# Patient Record
Sex: FEMALE | Race: WHITE | Employment: PART TIME | URBAN - METROPOLITAN AREA
[De-identification: names, ages, dates, MRNs, and addresses within clinical notes are randomized per-mention and may not be internally consistent; named-entity substitution may affect disease eponyms.]

---

## 2018-09-18 ENCOUNTER — TELEPHONE (OUTPATIENT)
Dept: FAMILY MEDICINE | Facility: CLINIC | Age: 23
End: 2018-09-18

## 2018-09-18 ENCOUNTER — OFFICE VISIT (OUTPATIENT)
Dept: FAMILY MEDICINE | Facility: CLINIC | Age: 23
End: 2018-09-18
Payer: COMMERCIAL

## 2018-09-18 VITALS
TEMPERATURE: 98.2 F | HEIGHT: 68 IN | DIASTOLIC BLOOD PRESSURE: 72 MMHG | OXYGEN SATURATION: 99 % | SYSTOLIC BLOOD PRESSURE: 108 MMHG | HEART RATE: 50 BPM | BODY MASS INDEX: 20.95 KG/M2 | WEIGHT: 138.2 LBS

## 2018-09-18 DIAGNOSIS — F60.3 BORDERLINE PERSONALITY DISORDER (H): ICD-10-CM

## 2018-09-18 DIAGNOSIS — G25.81 RESTLESS LEGS SYNDROME (RLS): ICD-10-CM

## 2018-09-18 DIAGNOSIS — F33.41 RECURRENT MAJOR DEPRESSIVE DISORDER, IN PARTIAL REMISSION (H): ICD-10-CM

## 2018-09-18 DIAGNOSIS — G43.811 OTHER MIGRAINE WITH STATUS MIGRAINOSUS, INTRACTABLE: ICD-10-CM

## 2018-09-18 DIAGNOSIS — G89.29 CHRONIC BILATERAL LOW BACK PAIN WITH RIGHT-SIDED SCIATICA: ICD-10-CM

## 2018-09-18 DIAGNOSIS — H53.9 VISUAL DISTURBANCE: ICD-10-CM

## 2018-09-18 DIAGNOSIS — E55.9 VITAMIN D DEFICIENCY: ICD-10-CM

## 2018-09-18 DIAGNOSIS — D50.9 IRON DEFICIENCY ANEMIA, UNSPECIFIED IRON DEFICIENCY ANEMIA TYPE: ICD-10-CM

## 2018-09-18 DIAGNOSIS — M54.41 CHRONIC BILATERAL LOW BACK PAIN WITH RIGHT-SIDED SCIATICA: ICD-10-CM

## 2018-09-18 LAB
ALBUMIN UR-MCNC: NEGATIVE MG/DL
APPEARANCE UR: CLEAR
BILIRUB UR QL STRIP: NEGATIVE
COLOR UR AUTO: YELLOW
ERYTHROCYTE [DISTWIDTH] IN BLOOD BY AUTOMATED COUNT: 13.9 % (ref 10–15)
FERRITIN SERPL-MCNC: 32 NG/ML (ref 12–150)
GLUCOSE UR STRIP-MCNC: NEGATIVE MG/DL
HCT VFR BLD AUTO: 45.7 % (ref 35–47)
HGB BLD-MCNC: 16.2 G/DL (ref 11.7–15.7)
HGB UR QL STRIP: NEGATIVE
KETONES UR STRIP-MCNC: NEGATIVE MG/DL
LEUKOCYTE ESTERASE UR QL STRIP: NEGATIVE
MCH RBC QN AUTO: 33 PG (ref 26.5–33)
MCHC RBC AUTO-ENTMCNC: 35.4 G/DL (ref 31.5–36.5)
MCV RBC AUTO: 93 FL (ref 78–100)
NITRATE UR QL: NEGATIVE
PH UR STRIP: 6 PH (ref 5–7)
PLATELET # BLD AUTO: 202 10E9/L (ref 150–450)
RBC # BLD AUTO: 4.91 10E12/L (ref 3.8–5.2)
SOURCE: NORMAL
SP GR UR STRIP: <=1.005 (ref 1–1.03)
UROBILINOGEN UR STRIP-ACNC: 0.2 EU/DL (ref 0.2–1)
WBC # BLD AUTO: 6.4 10E9/L (ref 4–11)

## 2018-09-18 PROCEDURE — 80053 COMPREHEN METABOLIC PANEL: CPT | Performed by: PHYSICIAN ASSISTANT

## 2018-09-18 PROCEDURE — 81003 URINALYSIS AUTO W/O SCOPE: CPT | Performed by: PHYSICIAN ASSISTANT

## 2018-09-18 PROCEDURE — 84443 ASSAY THYROID STIM HORMONE: CPT | Performed by: PHYSICIAN ASSISTANT

## 2018-09-18 PROCEDURE — 82306 VITAMIN D 25 HYDROXY: CPT | Performed by: PHYSICIAN ASSISTANT

## 2018-09-18 PROCEDURE — 82728 ASSAY OF FERRITIN: CPT | Performed by: PHYSICIAN ASSISTANT

## 2018-09-18 PROCEDURE — 36415 COLL VENOUS BLD VENIPUNCTURE: CPT | Performed by: PHYSICIAN ASSISTANT

## 2018-09-18 PROCEDURE — 85027 COMPLETE CBC AUTOMATED: CPT | Performed by: PHYSICIAN ASSISTANT

## 2018-09-18 PROCEDURE — 99205 OFFICE O/P NEW HI 60 MIN: CPT | Performed by: PHYSICIAN ASSISTANT

## 2018-09-18 NOTE — PATIENT INSTRUCTIONS
Increase dose of magnesium to 2-3 tsp daily  Follow up with physical therapy, sexual health, and neurology  Get labs done today  Have your pharmacy fax us a request for refills  Return to clinic for any new or worsening symptoms or go to ER Urgent care in off hours

## 2018-09-18 NOTE — TELEPHONE ENCOUNTER
Reason for call:  Other   Patient called regarding (reason for call): call back  Additional comments: The patient saw Leticia Pettymohinder today and was told that they needed to get their medical records sent over before medication could be prescribed. The medical records were faxed on the patients end and they would like a call back once they are received at the clinic so they know we have gotten them.    Phone number to reach patient:  Cell number on file:    Telephone Information:   Mobile [phone]       Best Time: Any    Can we leave a detailed message on this number?  YES

## 2018-09-18 NOTE — PROGRESS NOTES
"  SUBJECTIVE:   Sally Sanchez is a 22 year old female who presents to clinic today for the following health issues:    Medication Followup of Lamotrigine     Taking Medication as prescribed: yes    Side Effects:  None, but keeps focus and stay awake but feel that it is not working much.     Medication Helping Symptoms:  Yes, for borderline personality and depression      Questions/Concerns: referral to a therapist.    Used to live in [place] for 2 years.   Originally from [place],   Works full time at [employer]  Wants to get into nutrition,     Has been on lamotrigine for 2 years. Works well. Has been on his current dose for 1.5 years. Depression stable  Also gabapentin for RLS, doesn't seem to work very well  On testosterone  (transgender FtM)    Last PAP was 3 years ago. Normal.   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 Declines medical spine specialist    Also with right sided headaches. Behind his eye  Gets twitching of cheek and chin, only on right side.   Also gets muscle pain that comes and goes, can be intense. Gets sore very easily.     Gets about 7 hours of sleep per night, fluctuating quality. RLS keeps him up          Problem list and histories reviewed & adjusted, as indicated.  Additional history: as documented    ROS:  CONSTITUTIONAL: NEGATIVE for fever, chills, change in weight  INTEGUMENTARY/SKIN: NEGATIVE for worrisome rashes, moles or lesions  ENT/MOUTH: NEGATIVE for ear, mouth and throat problems  RESP: NEGATIVE for significant cough or SOB  CV: NEGATIVE for chest pain, palpitations or peripheral edema  GI: NEGATIVE for nausea, abdominal pain, heartburn, or change " "in bowel habits  : NEGATIVE for frequency, dysuria, or hematuria  NEURO: NEGATIVE for behavior changes, dysphagia, involuntary movements, loss of consciousness, memory problems and vertigo  ENDOCRINE: NEGATIVE for temperature intolerance, skin/hair changes  HEME: NEGATIVE for bleeding problems  PSYCHIATRIC: NEGATIVE for changes in mood or affect    Patient Active Problem List   Diagnosis     Restless legs syndrome (RLS)     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 No pertinent surgical history.    Social History   Substance Use Topics     Smoking status: Never Smoker     Smokeless tobacco: Never Used     Alcohol use Yes      Comment: not often      History reviewed. No pertinent family history.            OBJECTIVE:                                                    /72  Pulse 50  Temp 98.2  F (36.8  C) (Oral)  Ht 5' 7.91\" (1.725 m)  Wt 138 lb 3.2 oz (62.7 kg)  SpO2 99%  BMI 21.07 kg/m2 Body mass index is 21.07 kg/(m^2).   GENERAL: healthy, alert, well nourished, well hydrated, no distress  EYES: Eyes grossly normal to inspection, extraocular movements - intact, and PERRL  HENT: ear canals- normal; TMs- normal; Nose- normal; Mouth- no ulcers, no lesions  NECK: no tenderness, no adenopathy, no asymmetry, no masses, no stiffness; thyroid- normal to palpation  RESP: lungs clear to auscultation - no rales, no rhonchi, no wheezes  CV: regular rates and rhythm, normal S1 S2, no S3 or S4 and no murmur, no click or rub -  MS: extremities- no gross deformities noted, no edema  SKIN: no suspicious lesions, no rashes  NEURO: strength and tone- normal, sensory exam- grossly normal, mentation- intact, speech- normal, reflexes- symmetric  PSYCH: Alert and oriented times 3; speech- coherent , normal rate and volume; able to articulate logical thoughts, able to abstract reason, no " tangential thoughts, no hallucinations or delusions, affect- normal    No results found for this or any previous visit (from the past 24 hour(s)).       ASSESSMENT/PLAN:                                                        ICD-10-CM    1. 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 Restless legs syndrome (RLS) G25.81 NEUROLOGY ADULT REFERRAL     CBC with platelets     Ferritin     Vitamin D Deficiency     Comprehensive metabolic panel     *UA reflex to Microscopic     TSH with free T4 reflex   3. Recurrent major depressive disorder, in partial remission (H) F33.41    4. Borderline personality disorder F60.3    5. Chronic bilateral low back pain with right-sided sciatica M54.41 [facility] PT, HAND, AND CHIROPRACTIC REFERRAL    G89.29    6. Other migraine with status migrainosus, intractable G43.811 NEUROLOGY ADULT REFERRAL   7. Visual disturbance H53.9 OPHTHALMOLOGY ADULT REFERRAL     Several compalints today. Follow-up with neurology to further discuss RLS, migraine issues and facial twitching. I'll get some baseline labs today. Follow up with physical therapy for chronic low back pain to work on core strengthening. Follow up with sexual health for hormone therapy. I will continue to prescribe lamotrigine as he has been stable on this dose for a quite a while. Same with gabapentin (although may need to try something else at recommendation of neurology since this doesn't seem to be working). Check iron levels for this as well. Follow up with eye doc. Return to clinic for any new or worsening symptoms or go to ER Urgent care in off hours    Patient Instructions   Increase dose of magnesium to 2-3 tsp daily  Follow up with physical therapy, sexual health, and neurology  Get labs done today  Have your pharmacy fax us a request for refills  Return to clinic for any new or worsening symptoms or go to ER Urgent care in off hours            Estimated body mass index is  "21.07 kg/(m^2) as calculated from the following:    Height as of this encounter: 5' 7.91\" (1.725 m).    Weight as of this encounter: 138 lb 3.2 oz (62.7 kg).       Pretty Estrada  [facility]    "

## 2018-09-18 NOTE — LETTER
September 21, 2018      Sally Sanchez  [address] [facility]   [place] [address]        Dear ,    Your iron and Vitamin D levels are low. Medication sent to pharmacy to start taking. Iron may help with RLS. Otherwise labs are normal       Resulted Orders   CBC with platelets   Result Value Ref Range    WBC 6.4 4.0 - 11.0 10e9/L    RBC Count 4.91 3.8 - 5.2 10e12/L    Hemoglobin 16.2 (H) 11.7 - 15.7 g/dL    Hematocrit 45.7 35.0 - 47.0 %    MCV 93 78 - 100 fl    MCH 33.0 26.5 - 33.0 pg    MCHC 35.4 31.5 - 36.5 g/dL    RDW 13.9 10.0 - 15.0 %    Platelet Count 202 150 - 450 10e9/L   Ferritin   Result Value Ref Range    Ferritin 32 12 - 150 ng/mL   Vitamin D Deficiency   Result Value Ref Range    Vitamin D Deficiency screening 15 (L) 20 - 75 ug/L      Comment:      Season, race, dietary intake, and treatment affect the concentration of   25-hydroxy-Vitamin D. Values may decrease during winter months and increase   during summer months. Values 20-29 ug/L may indicate Vitamin D insufficiency   and values <20 ug/L may indicate Vitamin D deficiency.  Vitamin D determination is routinely performed by an immunoassay specific for   25 hydroxyvitamin D3.  If an individual is on vitamin D2 (ergocalciferol)   supplementation, please specify 25 OH vitamin D2 and D3 level determination by   LCMSMS test VITD23.     Comprehensive metabolic panel   Result Value Ref Range    Sodium 140 133 - 144 mmol/L    Potassium 4.4 3.4 - 5.3 mmol/L    Chloride 108 94 - 109 mmol/L    Carbon Dioxide 23 20 - 32 mmol/L    Anion Gap 9 3 - 14 mmol/L    Glucose 79 70 - 99 mg/dL    Urea Nitrogen 10 7 - 30 mg/dL    Creatinine 0.73 0.52 - 1.04 mg/dL    GFR Estimate >90 >60 mL/min/1.7m2      Comment:      Non  GFR Calc    GFR Estimate If Black >90 >60 mL/min/1.7m2      Comment:       GFR Calc    Calcium 8.5 8.5 - 10.1 mg/dL    Bilirubin Total 1.4 (H) 0.2 - 1.3 mg/dL    Albumin 4.1 3.4 - 5.0 g/dL    Protein Total  6.8 6.8 - 8.8 g/dL    Alkaline Phosphatase 52 40 - 150 U/L    ALT 22 0 - 50 U/L    AST 25 0 - 45 U/L   *UA reflex to Microscopic   Result Value Ref Range    Color Urine Yellow     Appearance Urine Clear     Glucose Urine Negative NEG^Negative mg/dL    Bilirubin Urine Negative NEG^Negative    Ketones Urine Negative NEG^Negative mg/dL    Specific Gravity Urine <=1.005 1.003 - 1.035    Blood Urine Negative NEG^Negative    pH Urine 6.0 5.0 - 7.0 pH    Protein Albumin Urine Negative NEG^Negative mg/dL    Urobilinogen Urine 0.2 0.2 - 1.0 EU/dL    Nitrite Urine Negative NEG^Negative    Leukocyte Esterase Urine Negative NEG^Negative    Source Midstream Urine    TSH with free T4 reflex   Result Value Ref Range    TSH 0.95 0.40 - 4.00 mU/L       If you have any questions or concerns, please call the clinic at the number listed above.       Sincerely,        Pretty Estrada PA-C

## 2018-09-18 NOTE — MR AVS SNAPSHOT
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    -  1    Restless legs syndrome (RLS)        Recurrent major depressive disorder, in partial remission (H)        Borderline personality disorder        Chronic bilateral low back pain with right-sided sciatica        Other migraine with status migrainosus, intractable        Visual disturbance          Care Instructions    Increase dose of magnesium to 2-3 tsp daily  Follow up with physical therapy, sexual health, and neurology  Get labs done today  Have your pharmacy fax us a request for refills  Return to clinic for any new or worsening symptoms or go to ER Urgent care in off hours              Follow-ups after your visit        Additional Services     [facility]       Reason for referral? On hormone therapy for transgender female to males  Is the patient transgengered? Yes   What is the patient's preferred name? Rodo  Is the patient MtF or FtM? Ft    Appointment Line: [phone]            [facility] PT, HAND, AND CHIROPRACTIC REFERRAL       **This order will print in the [facility] Scheduling Office**    Physical Therapy, Hand Therapy and Chiropractic Care are available through:    *[facility]  *[facility] [place]  *[facility]    Call one number to schedule at any of the above locations: [phone].    Your provider has referred you to: Physical Therapy at [facility] or [facility]    Indication/Reason for Referral: low back pain with right sided sciatica  Onset of Illness: \"since I can remember\"  Therapy Orders: Evaluate and Treat  Special Programs: None  Special Request: None    Patricia Gomez "      Additional Comments for the Therapist or Chiropractor:     Please be aware that coverage of these services is subject to the terms and limitations of your health insurance plan.  Call member services at your health plan with any benefit or coverage questions.      Please bring the following to your appointment:    *Your personal calendar for scheduling future appointments  *Comfortable clothing            NEUROLOGY ADULT REFERRAL       Your provider has referred you for the following:   Consult at [facility]: [facility] [phone]   [email or web address]  [facility]: [facility] [phone]   [email or web address]    Please be aware that coverage of these services is subject to the terms and limitations of your health insurance plan.  Call member services at your health plan with any benefit or coverage questions.      Please bring the following with you to your appointment:    (1) Any X-Rays, CTs or MRIs which have been performed.  Contact the facility where they were done to arrange for  prior to your scheduled appointment.    (2) List of current medications  (3) This referral request   (4) Any documents/labs given to you for this referral            OPHTHALMOLOGY ADULT REFERRAL       Your provider has referred you to: [facility]: [facility] [place] [phone]   [email or web address]    Please be aware that coverage of these services is subject to the terms and limitations of your health insurance plan.  Call member services at your health plan with any benefit or coverage questions.      Please bring the following with you to your appointment:    (1) Any X-Rays, CTs or MRIs which have been performed.  Contact the facility where they were done to arrange for  prior to your scheduled appointment.    (2) List of current medications  (3) This referral request   (4) Any  "documents/labs given to you for this referral                  Who to contact     If you have questions or need follow up information about today's clinic visit or your schedule please contact [facility] directly at [phone].  Normal or non-critical lab and imaging results will be communicated to you by MyChart, letter or phone within 4 business days after the clinic has received the results. If you do not hear from us within 7 days, please contact the clinic through MyChart or phone. If you have a critical or abnormal lab result, we will notify you by phone as soon as possible.  Submit refill requests through [employer] or call your pharmacy and they will forward the refill request to us. Please allow 3 business days for your refill to be completed.          Additional Information About Your Visit        [employer] Information     [employer] lets you send messages to your doctor, view your test results, renew your prescriptions, schedule appointments and more. To sign up, go to [email or web address] . Click on \"Log in\" on the left side of the screen, which will take you to the Welcome page. Then click on \"Sign up Now\" on the right side of the page.     You will be asked to enter the access code listed below, as well as some personal information. Please follow the directions to create your username and password.     Your access code is: [email or web address]  Expires: 2018 11:04 AM     Your access code will  in 90 days. If you need help or a new code, please call your [facility] or [phone].        Care EveryWhere ID     This is your Care EveryWhere ID. This could be used by other organizations to access your [place] medical records  [id number]        Your Vitals Were     Pulse Temperature Height Pulse Oximetry BMI (Body Mass Index)       50 98.2  F (36.8  C) (Oral) 5' 7.91\" (1.725 m) 99% 21.07 kg/m2        Blood Pressure from Last 3 Encounters:   18 108/72    Weight from Last 3 " Encounters:   09/18/18 138 lb 3.2 oz (62.7 kg)              We Performed the Following     *UA reflex to Microscopic     CBC with platelets     [facility]     Comprehensive metabolic panel     Ferritin     [facility] PT, HAND, AND CHIROPRACTIC REFERRAL     NEUROLOGY ADULT REFERRAL     OPHTHALMOLOGY ADULT REFERRAL     TSH with free T4 reflex     Vitamin D Deficiency        Primary Care Provider Office Phone # Fax #    [facility] [phone]       [address] [address]        Equal Access to Services     PHOENIX CERDA : Hadii aad ku hadasho Soomaali, waaxda luqadaha, qaybta kaalmada adeegyada, waxay idiin hayaan adeeg kharash la'mag jaeger. So [facility] [phone].    ATENCIÓN: Si habla español, tiene a russell disposición servicios gratuitos de asistencia lingüística. Ll[facility] [place] [phone].    We comply with applicable federal civil rights laws and [place] laws. We do not discriminate on the basis of race, color, national origin, age, disability, sex, sexual orientation, or gender identity.            Thank you!     Thank you for choosing [facility]  for your care. Our goal is always to provide you with excellent care. Hearing back from our patients is one way we can continue to improve our services. Please take a few minutes to complete the written survey that you may receive in the mail after your visit with us. Thank you!             Your Updated Medication List - Protect others around you: Learn how to safely use, store and throw away your medicines at www.disposemymeds.org.      Notice  As of 9/18/2018 11:04 AM    You have not been prescribed any medications.

## 2018-09-19 ENCOUNTER — TELEPHONE (OUTPATIENT)
Dept: FAMILY MEDICINE | Facility: CLINIC | Age: 23
End: 2018-09-19

## 2018-09-19 DIAGNOSIS — F33.41 RECURRENT MAJOR DEPRESSIVE DISORDER, IN PARTIAL REMISSION (H): ICD-10-CM

## 2018-09-19 DIAGNOSIS — G25.81 RESTLESS LEGS SYNDROME (RLS): Primary | ICD-10-CM

## 2018-09-19 LAB
ALBUMIN SERPL-MCNC: 4.1 G/DL (ref 3.4–5)
ALP SERPL-CCNC: 52 U/L (ref 40–150)
ALT SERPL W P-5'-P-CCNC: 22 U/L (ref 0–50)
ANION GAP SERPL CALCULATED.3IONS-SCNC: 9 MMOL/L (ref 3–14)
AST SERPL W P-5'-P-CCNC: 25 U/L (ref 0–45)
BILIRUB SERPL-MCNC: 1.4 MG/DL (ref 0.2–1.3)
BUN SERPL-MCNC: 10 MG/DL (ref 7–30)
CALCIUM SERPL-MCNC: 8.5 MG/DL (ref 8.5–10.1)
CHLORIDE SERPL-SCNC: 108 MMOL/L (ref 94–109)
CO2 SERPL-SCNC: 23 MMOL/L (ref 20–32)
CREAT SERPL-MCNC: 0.73 MG/DL (ref 0.52–1.04)
DEPRECATED CALCIDIOL+CALCIFEROL SERPL-MC: 15 UG/L (ref 20–75)
GFR SERPL CREATININE-BSD FRML MDRD: >90 ML/MIN/1.7M2
GLUCOSE SERPL-MCNC: 79 MG/DL (ref 70–99)
POTASSIUM SERPL-SCNC: 4.4 MMOL/L (ref 3.4–5.3)
PROT SERPL-MCNC: 6.8 G/DL (ref 6.8–8.8)
SODIUM SERPL-SCNC: 140 MMOL/L (ref 133–144)
TSH SERPL DL<=0.005 MIU/L-ACNC: 0.95 MU/L (ref 0.4–4)

## 2018-09-19 RX ORDER — LAMOTRIGINE 100 MG/1
TABLET ORAL
Qty: 60 TABLET | Refills: 1 | Status: SHIPPED | OUTPATIENT
Start: 2018-09-19 | End: 2019-02-18

## 2018-09-19 RX ORDER — GABAPENTIN 600 MG/1
TABLET ORAL
Qty: 60 TABLET | Refills: 1 | Status: SHIPPED | OUTPATIENT
Start: 2018-09-19 | End: 2019-06-13

## 2018-09-19 NOTE — TELEPHONE ENCOUNTER
Spoke with pt, she will request the refills from the pharmacy be sent to us as requested by Leticia  Also let pt know that we have not received any records from the previous provider     lamotrigine and Gabapentin     was checked no fills found for gabapentin    Kami Ortega RN   [facility]

## 2018-09-19 NOTE — TELEPHONE ENCOUNTER
Left message with the medical records people at [facility] to call back regarding the medical records. Sally was notified that I would be working on this.

## 2018-09-19 NOTE — TELEPHONE ENCOUNTER
"Leticia,     Received notes of last office visit with patient's previous provider. Cued prescriptions for patient noted in patient's OV notes under \"current medications.\"     Gabapentin 600 mg; take 2 tabs at HS  Lamictal 100 mg; take BID.     Please review/sign or advise.    Odessa Patiño RN  [facility]  "

## 2018-09-19 NOTE — TELEPHONE ENCOUNTER
We have been waiting for records from Sally's other doctor in order to renew a couple of medications that she has. I have left a message for them to call us back and the release has been sent, but we have not received anything yet. She has been out of this medication for a couple of days and said that it has been hard for her. Said that she is taking 100mg of lamotrigine and 600 mg of the Gabapentin and wondering if there is anything that can be done to help her. She can be reached at [phone].

## 2018-09-20 RX ORDER — FERROUS SULFATE 325(65) MG
325 TABLET ORAL
Qty: 90 TABLET | Refills: 2 | Status: SHIPPED | OUTPATIENT
Start: 2018-09-20

## 2018-10-16 ENCOUNTER — OFFICE VISIT (OUTPATIENT)
Dept: OPHTHALMOLOGY | Facility: CLINIC | Age: 23
End: 2018-10-16
Attending: OPHTHALMOLOGY
Payer: COMMERCIAL

## 2018-10-16 DIAGNOSIS — H52.202 MYOPIA OF LEFT EYE WITH ASTIGMATISM: ICD-10-CM

## 2018-10-16 DIAGNOSIS — H05.10 TROCHLEITIS: Primary | ICD-10-CM

## 2018-10-16 DIAGNOSIS — H52.12 MYOPIA OF LEFT EYE WITH ASTIGMATISM: ICD-10-CM

## 2018-10-16 DIAGNOSIS — H31.012: ICD-10-CM

## 2018-10-16 PROCEDURE — 92015 DETERMINE REFRACTIVE STATE: CPT | Mod: ZF

## 2018-10-16 PROCEDURE — G0463 HOSPITAL OUTPT CLINIC VISIT: HCPCS | Mod: ZF

## 2018-10-16 RX ORDER — OMEGA-3 FATTY ACIDS/FISH OIL 300-1000MG
600 CAPSULE ORAL 3 TIMES DAILY
Qty: 42 CAPSULE | Refills: 0 | Status: SHIPPED | OUTPATIENT
Start: 2018-10-16 | End: 2018-10-30

## 2018-10-16 ASSESSMENT — TONOMETRY
IOP_METHOD: TONOPEN
OD_IOP_MMHG: 12
OS_IOP_MMHG: 12

## 2018-10-16 ASSESSMENT — CONF VISUAL FIELD
OD_NORMAL: 1
OS_NORMAL: 1

## 2018-10-16 ASSESSMENT — REFRACTION_MANIFEST
OS_CYLINDER: +0.50
OD_SPHERE: +0.25
OD_AXIS: 010
OD_CYLINDER: +0.25
OS_AXIS: 170
OS_SPHERE: -2.00

## 2018-10-16 ASSESSMENT — VISUAL ACUITY
OS_SC: 20/200
OD_SC: J1
OS_SC: 20/800
OD_SC: 20/20
METHOD: SNELLEN - LINEAR

## 2018-10-16 ASSESSMENT — SLIT LAMP EXAM - LIDS
COMMENTS: NORMAL
COMMENTS: NORMAL

## 2018-10-16 NOTE — MR AVS SNAPSHOT
After Visit Summary   10/16/2018    Sally Sanchez    MRN: [id number]           Patient Information     Date Of Birth          1995        Visit Information        Provider Department      10/16/2018 1:00 PM Joe Magallon MD [facility]        Today's Diagnoses     Trochleitis - Right Eye    -  1    Chorioretinal scar, macular, left - Left Eye        Myopia of left eye with astigmatism - Left Eye           Follow-ups after your visit        Follow-up notes from your care team     Return in about 1 month (around 2018) for PRN.      Who to contact     Please call your clinic at [phone] to:    Ask questions about your health    Make or cancel appointments    Discuss your medicines    Learn about your test results    Speak to your doctor            Additional Information About Your Visit        MyChart Information     Fileblaze is an electronic gateway that provides easy, online access to your medical records. With Fileblaze, you can request a clinic appointment, read your test results, renew a prescription or communicate with your care team.     To sign up for [employer] visit the website at [email or web address]   You will be asked to enter the access code listed below, as well as some personal information. Please follow the directions to create your username and password.     Your access code is: [email or web address]  Expires: 2018 11:04 AM     Your access code will  in 90 days. If you need help or a new code, please contact your [facility] or call [phone] for assistance.        Care EveryWhere ID     This is your Care EveryWhere ID. This could be used by other organizations to access your [place] medical records  [id number]         Blood Pressure from Last 3 Encounters:   18 108/72    Weight from Last 3 Encounters:   18 62.7 kg (138 lb 3.2 oz)              Today, you had the following     No orders found for display          Today's Medication Changes          These changes are accurate as of 10/16/18 11:59 PM.  If you have any questions, ask your nurse or doctor.               Start taking these medicines.        Dose/Directions    ibuprofen 200 MG capsule   Commonly known as:  [facility] IBUPROFEN   Used for:  Trochleitis   Started by:  Joe Magallon MD        Dose:  600 mg   Take 600 mg by mouth 3 times daily for 14 days   Quantity:  42 capsule   Refills:  0            Where to get your medicines      These medications were sent to [facility]/pharmacy #[id number] - [place], [place] - [address]  [address], [facility] [address]     Phone:  [phone]     ibuprofen 200 MG capsule                Primary Care Provider Office Phone # Fax #    [facility] [phone]       [address] [place] [address]  [facility] [address]        Equal Access to Services     PH[place]EN[place] CERDA : Ruth renner Sodevang, waaxda luqadaha, qaybta kaalmada adesusie, aida victor . So [facility] [phone].    ATENCIÓN: Si habla español, tiene a russell disposición servicios gratuitos de asistencia lingüística. Fredo al [phone].    We comply with applicable federal civil rights laws and [place] laws. We do not discriminate on the basis of race, color, national origin, age, disability, sex, sexual orientation, or gender identity.            Thank you!     Thank you for choosing EYE CLINIC  for your care. Our goal is always to provide you with excellent care. Hearing back from our patients is one way we can continue to improve our services. Please take a few minutes to complete the written survey that you may receive in the mail after your visit with us. Thank you!             Your Updated Medication List - Protect others around you: Learn how to safely use, store and throw away your medicines at www.disposemymeds.org.          This list is accurate as of 10/16/18 11:59 PM.  Always use your  most recent med list.                   Brand Name Dispense Instructions for use Diagnosis    cholecalciferol 5000 units Tabs tablet    vitamin D3    30 tablet    Take 1 tablet (5,000 Units) by mouth daily    Vitamin D deficiency       ferrous sulfate 325 (65 Fe) MG tablet    IRON    90 tablet    Take 1 tablet (325 mg) by mouth daily (with breakfast)    Iron deficiency anemia, unspecified iron deficiency anemia type       gabapentin 600 MG tablet    NEURONTIN    60 tablet    Take 2 tablets (1200 mg) at bedtime as needed.    Restless legs syndrome (RLS)       ibuprofen 200 MG capsule    [facility] IBUPROFEN    42 capsule    Take 600 mg by mouth 3 times daily for 14 days    Trochleitis       lamoTRIgine 100 MG tablet    LAMICTAL    60 tablet    Take 0.5 tablets two times daily    Recurrent major depressive disorder, in partial remission (H)

## 2018-10-16 NOTE — LETTER
10/16/2018       RE: Sally Sanchez  [address]  [address]  [place] [address]     Dear Colleague,    Thank you for referring your patient, Sally Sanchez, to the EYE CLINIC at [facility]. Please see a copy of my visit note below.    CC: Visual disturbances    HPI: Sally Sanchez is a 22 year old female to male transgender patient on exogenous hormonal therapy who presents for right frontal headaches, last 1-3 days. No visual aura. Started within past year. Used to have minor headaches. Floaters each eye. Also with increasing pain along Upper nasal corner right eye intensifying, occurs everyday.     Grandmother with migraines.     PMH:   Depression (Lamictal)  Restless leg syndrome (Gabapentin)  FtM transgender (Testosterone)    Social:  Non-smoker  Social alcohol use  Full time at [employer]; wants to become     POHx:  Retinal scar left eye since birth (told by mom years ago)    Current Eye Medications:   None     Review of Testing:  NA    Assessment & Plan   Sally Sanchez is a 22 year old FtM transgender patient with the following diagnoses:     1. Migraines    2. Trochleitis right eye   - Could also be TMJ related   - Ibuprofen 600 mg three times a day 2 weeks   - Return if not improved; could consider dex injection    3. 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 Mixed hyperopia and myopia with astigmatism   - 20/70 left eye from 20/200   - MRx offered      Return in about 1 month (around 11/16/2018) for PRN.       Again, thank you for allowing me to participate in the care of your patient.      Sincerely,        Jose Gonzalez MD  , Comprehensive Ophthalmology  Department of Ophthalmology and Visual Neurosciences  [facility]

## 2018-10-16 NOTE — NURSING NOTE
Chief Complaints and History of Present Illnesses   Patient presents with     New Patient     visual disturbance     HPI    Affected eye(s):  Both   Symptoms:     Floaters (Comment: left eye)   Photophobia      Frequency:  Intermittent       Do you have eye pain now?:  Yes   Location:  OU   Pain Frequency:  Intermittent   Pain Characteristics:  Aching      Comments:  New patient is here for visual disturbance.  The patient notes pressure behind both eyes (right to left).  The patient has increased migraine headaches.   NURA Ibarra 1:21 PM 10/16/2018

## 2018-10-16 NOTE — PROGRESS NOTES
CC: Visual disturbances    HPI: Sally Sanchez is a 22 year old female to male transgender patient on exogenous hormonal therapy who presents for right frontal headaches, last 1-3 days. No visual aura. Started within past year. Used to have minor headaches. Floaters each eye. Also with increasing pain along Upper nasal corner right eye intensifying, occurs everyday.     Grandmother with migraines.     PMH:   Depression (Lamictal)  Restless leg syndrome (Gabapentin)  FtM transgender (Testosterone)    Social:  Non-smoker  Social alcohol use  Full time at [employer]; wants to become     POHx:  Retinal scar left eye since birth (told by mom years ago)    Current Eye Medications:   None     Review of Testing:  NA    Assessment & Plan   Sally Sanchez is a 22 year old FtM transgender patient with the following diagnoses:     1. Migraines    2. Trochleitis right eye   - Could also be TMJ related   - Ibuprofen 600 mg three times a day 2 weeks   - Return if not improved; could consider dex injection    3. 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 Mixed hyperopia and myopia with astigmatism   - 20/70 left eye from 20/200   - MRx offered      Return in about 1 month (around 11/16/2018) for PRN.     Joe Magallon, PGY-3  Ophthalmology    Attending Physician Attestation:  Complete documentation of historical and exam elements from today's encounter can be found in the full encounter summary report (not reduplicated in this progress note).  I personally obtained the chief complaint(s) and history of present illness.  I confirmed and edited as necessary the review of systems, past medical/surgical history, family history, social history, and examination findings as documented by others; and I examined the patient myself.  I personally reviewed the relevant tests, images, and reports as documented above.  I formulated and edited as necessary the assessment and plan  and discussed the findings and management plan with the patient and family. . - Jose Gonzalez MD

## 2019-02-18 DIAGNOSIS — F33.41 RECURRENT MAJOR DEPRESSIVE DISORDER, IN PARTIAL REMISSION (H): ICD-10-CM

## 2019-02-18 RX ORDER — LAMOTRIGINE 100 MG/1
TABLET ORAL
Qty: 60 TABLET | Refills: 1 | Status: SHIPPED | OUTPATIENT
Start: 2019-02-18 | End: 2019-05-11

## 2019-02-18 NOTE — TELEPHONE ENCOUNTER
Leticia,  Please review/sign or advise for refill request of: lamoTRIgine (LAMICTAL) 100 MG tablet    Routing refill request to provider for review/approval because:  Associated diagnosis not on [facility] refill protocol: Recurrent major depressive disorder, in partial remission (H) [F33.41]     Thank You!  Morena Vasques RN  Triage Nurse

## 2019-02-18 NOTE — TELEPHONE ENCOUNTER
"Requested Prescriptions   Pending Prescriptions Disp Refills     lamoTRIgine (LAMICTAL) 100 MG tablet 60 tablet 1    Last Written Prescription Date:  09/19/2018  Last Fill Quantity: 60,  # refills: 1   Last office visit: 9/18/2018 with prescribing provider:  09/18/2018   Future Office Visit:   Sig: Take 0.5 tablets two times daily    Anti-Seizure Meds Protocol  Failed - 2/18/2019 11:07 AM       Failed - Review Authorizing provider's last note.     Refer to last progress notes: confirm request is for original authorizing provider (cannot be through other providers).         Passed - Recent (12 mo) or future (30 days) visit within the authorizing provider's specialty    Patient had office visit in the last 12 months or has a visit in the next 30 days with authorizing provider or within the authorizing provider's specialty.  See \"Patient Info\" tab in inbasket, or \"Choose Columns\" in Meds & Orders section of the refill encounter.             Passed - Normal CBC on file in past 26 months    Recent Labs   Lab Test 09/18/18  1111   WBC 6.4   RBC 4.91   HGB 16.2*   HCT 45.7                   Passed - Normal ALT or AST on file in past 26 months    Recent Labs   Lab Test 09/18/18  1111   ALT 22     Recent Labs   Lab Test 09/18/18  1111   AST 25            Passed - Normal platelet count on file in past 26 months    Recent Labs   Lab Test 09/18/18  1111                 Passed - Medication is active on med list       Passed - No active pregnancy on record       Passed - No positive pregnancy test in last 12 months        "

## 2019-02-19 ENCOUNTER — TELEPHONE (OUTPATIENT)
Dept: FAMILY MEDICINE | Facility: CLINIC | Age: 24
End: 2019-02-19

## 2019-02-19 NOTE — TELEPHONE ENCOUNTER
Reason for call:  Per patient: needs a refill on lamoTRIgine (LAMICTAL) 100 MG tablet, ran out and appt isn't until 2/21/19    Phone number to reach patient:  Cell number on file:    Telephone Information:   Mobile [phone]       Best Time:  Anytime    Can we leave a detailed message on this number?  YES

## 2019-02-19 NOTE — TELEPHONE ENCOUNTER
Per chart review, medication sent to patient's pharmacy on 02/18/2019 #60/1 refill.     Called patient, left voicemail to return call to clinic    Morena Vasques, RN  Triage Nurse

## 2019-02-20 NOTE — TELEPHONE ENCOUNTER
Called patient, left voicemail to return call to clinic.    Requested medication sent to patient's pharmacy on 02/18/2019 #60/1 refill.     Morena Vasques, ARIELLE  Triage Nurse

## 2019-02-21 NOTE — TELEPHONE ENCOUNTER
Patient returned call to clinic, patient states he has already picked up medication. Patient does not have any further questions or concerns at this time    Closing encounter, no further action required    Morena Vasques, RN  Triage Nurse

## 2019-02-26 ENCOUNTER — OFFICE VISIT (OUTPATIENT)
Dept: FAMILY MEDICINE | Facility: CLINIC | Age: 24
End: 2019-02-26
Payer: COMMERCIAL

## 2019-02-26 VITALS
TEMPERATURE: 98.5 F | HEIGHT: 67 IN | OXYGEN SATURATION: 97 % | WEIGHT: 137.7 LBS | BODY MASS INDEX: 21.61 KG/M2 | SYSTOLIC BLOOD PRESSURE: 120 MMHG | HEART RATE: 96 BPM | DIASTOLIC BLOOD PRESSURE: 78 MMHG

## 2019-02-26 DIAGNOSIS — G47.09 OTHER INSOMNIA: Primary | ICD-10-CM

## 2019-02-26 DIAGNOSIS — M54.2 NECK PAIN: ICD-10-CM

## 2019-02-26 DIAGNOSIS — M54.50 BILATERAL LOW BACK PAIN WITHOUT SCIATICA, UNSPECIFIED CHRONICITY: ICD-10-CM

## 2019-02-26 PROCEDURE — 99214 OFFICE O/P EST MOD 30 MIN: CPT | Performed by: PHYSICIAN ASSISTANT

## 2019-02-26 ASSESSMENT — MIFFLIN-ST. JEOR: SCORE: 1407.23

## 2019-02-26 NOTE — PROGRESS NOTES
SUBJECTIVE:   Sally Sanchez is a 23 year old female who presents to clinic today for the following health issues:    Depression Followup    Status since last visit: Stable     See PHQ-9 for current symptoms.  Other associated symptoms: trouble sleeping, took Gabapentin before but not taking it now, feel that it didn't help.     Complicating factors:   Significant life event:  No   Current substance abuse:  None  Anxiety or Panic symptoms:  No    No flowsheet data found.    PHQ-9  English  PHQ-9   Any Language  Suicide Assessment Five-step Evaluation and Treatment (SAFE-T)    Amount of exercise or physical activity: 2-3 days/week for an average of 15-30 minutes    Problems taking medications regularly: No    Medication side effects: none    Diet: regular (no restrictions)    -Patient reports having difficulty sleeping for a long time, has been worse recently  -She feels tired and ready for bed, still has trouble falling sleep, also states she is a light sleep and has trouble staying asleep  -She expresses that she feels very tired around 5 pm  -Patient has been taking Lamictal for approximately 2.5 years  -Has been taking testosterone for almost two years  -Patient feels like she used to ruminate over things more in the past  -She believes she worries about things approximately 30% of nights  -Patient smokes marijuana to help with sleep, has tried melatonin in the past    Musculoskeletal  -Patient is concerned about hr spine being crooked, her head can feel tingly on his right side at times  -Has pain in his low to mid back, believes it is due to an irritated nerve    Problem list and histories reviewed & adjusted, as indicated.  Additional history: as documented    ROS:  C: NEGATIVE for fever, chills, change in weight  ENDOCRINE: NEGATIVE for temperature intolerance, skin/hair changes  PSYCHIATRIC: NEGATIVE for changes in mood or affect    This document serves as a record of the services and decisions personally  "performed and made by Pretty Estrada PA-C. It was created on her behalf by Agus Palacios, trained medical scribe. The creation of this document is based on the provider's statements to the medical scribe.  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 No pertinent surgical history.    Social History     Tobacco Use     Smoking status: Never Smoker     Smokeless tobacco: Never Used   Substance Use Topics     Alcohol use: Yes     Comment: not often      History reviewed. No pertinent family history.        Problem list, Medication list, Allergies, and Medical/Social/Surgical histories reviewed in EPIC and updated as appropriate.    OBJECTIVE:                                                    /78   Pulse 96   Temp 98.5  F (36.9  C) (Oral)   Ht 1.702 m (5' 7\")   Wt 62.5 kg (137 lb 11.2 oz)   LMP 02/22/2019 (Exact Date)   SpO2 97%   BMI 21.57 kg/m   Body mass index is 21.57 kg/m .   GENERAL:  Alert and oriented x 3.  WD WN  EYES: Eyes grossly normal to inspection, PERRL and conjunctivae and sclerae normal  NECK: no adenopathy, no asymmetry, masses, or scars and thyroid normal to palpation  HEART:  Regular rhythm.  Normal rate.  No murmur, gallop, rub or  ectopy.  PMI is not displaced.  LUNGS:  Clear to auscultation bilaterally without rhonchi rhales or wheezes. Chest rise symmetrical and no tenderness to palpation. Good breath sounds throughout. NO deformity and no accessory muscle use  SKIN:  Warm and dry.   MS: no gross musculoskeletal defects noted, no edema, limited neck ROM laterally to 45 degrees, rotation to 60 degrees bilaterally, mild tenderness to the mid thoracic spine, right SI joint, tightness in the left rhomboid   PSYCH: mentation " "appears normal, affect normal/bright    No results found for this or any previous visit (from the past 24 hour(s)).       ASSESSMENT/PLAN:                                                        ICD-10-CM    1. Other insomnia G47.09 SLEEP PSYCHOLOGY REFERRAL   2. Neck pain M54.2    3. Bilateral low back pain without sciatica, unspecified chronicity M54.5          Patient Instructions   Happy Light 2 hours in the morning, 1 hour in the afternoon with the lower setting.   Try a 0.25 mcg melatonin sublingually at night when you wake up  Follow up with sleep psychologist  Tanya Ferro 500 mg per night for 7-14 days  [facility] Phone: [phone]  [facility]  Address: [address], [place] [address]  [facility] [employer]\  Return to clinic for any new or worsening symptoms or go to ER Urgent care in off hours          Estimated body mass index is 21.57 kg/m  as calculated from the following:    Height as of this encounter: 1.702 m (5' 7\").    Weight as of this encounter: 62.5 kg (137 lb 11.2 oz).       The information in this document, created by the medical scribe for me, accurately reflects the services I personally performed and the decisions made by me. I have reviewed and approved this document for accuracy prior to leaving the patient care area.  February 26, 2019 1:46 PM    Pretty Estrada  [facility]        "

## 2019-02-26 NOTE — PATIENT INSTRUCTIONS
Happy Light 2 hours in the morning, 1 hour in the afternoon with the lower setting.   Try a 0.25 mcg melatonin sublingually at night when you wake up  Follow up with sleep psychologist  Tanya Ferro 500 mg per night for 7-14 days  [facility] Phone: [phone]  [facility]  Address: [address], [place] [address]  [employer]\  Return to clinic for any new or worsening symptoms or go to ER Urgent care in off hours

## 2019-03-25 ENCOUNTER — TELEPHONE (OUTPATIENT)
Dept: FAMILY MEDICINE | Facility: CLINIC | Age: 24
End: 2019-03-25

## 2019-03-25 NOTE — LETTER
[address]  [address]  [facility] [address]  Phone: [phone]  Fax: [phone]              March 25, 2019      Sally Sanchez  [address] [place] [address] [place]    [facility] [address]        Dear Sally,    I care about your health and have reviewed your health plan. I have reviewed your medical conditions, medication list, and lab results and am making recommendations based on this review, to better manage your health.    You are in particular need of attention regarding:  -Cervical Cancer Screening    I am recommending that you:  -schedule a PAP SMEAR EXAM which is due.  Please disregard this reminder if you have had this exam elsewhere within the last year.  It would be helpful for us to have a copy of your recent pap smear report in our file so that we can best coordinate your care.    If you are under/uninsured, we recommend you contact the [facility]. They offer pap smears at no charge or on a sliding fee charge. You can schedule with them at [phone]. Please have them send us the results.      Here is a list of Health Maintenance topics that are due now or due soon:  Health Maintenance Due   Topic Date Due     Preventive Care Visit  1995     Chlamydia Screening Lab - yearly  1995     Diptheria Tetanus Pertussis (DTAP/TDAP/TD) Vaccine (1 - Tdap) 11/01/2001     HPV Vaccine (1 - Female 3-dose series) 11/01/2009     Depression Action Plan Review  11/01/2012     HIV SCREEN (SYSTEM ASSIGNED)  11/01/2012     Depression Assessment - every 6 months  11/01/2012     Pap Smear - every 3 years  11/01/2015     Flu Vaccine (1) 09/01/2018       Please call us at [phone] (or use [facility]IMA) to address the above recommendations.     Thank you for trusting [facility] and we appreciate the opportunity to serve you.  We look forward to supporting your healthcare needs in the future.    Healthy Regards,    Pretty Estrada PA-C

## 2019-05-01 NOTE — PROGRESS NOTES
"  SUBJECTIVE:   Sally Sanchez is a 23 year old female who presents to clinic today for the following   health issues:    Depression and Anxiety Follow-Up    Status since last visit: Worsened     Other associated symptoms: Had an episode over the weekend that can't get out of.     Got set up with \"[facility]\" to help with mental health crisis. Has a  started seeing yesterday.     Looking for more of an outpatient program    Complicating factors:     Significant life event: No     Current substance abuse: None    No flowsheet data found.  No flowsheet data found.    PHQ-9  English  PHQ-9   Any Language  LOUIE-7  Suicide Assessment Five-step Evaluation and Treatment (SAFE-T)    Amount of exercise or physical activity: 2-3 days/week for an average of 30-45 minutes    Problems taking medications regularly: Not as consistent every day    Medication side effects: Wondering if face twitching is from the medication this has been going on for a while    Diet: regular (no restrictions), sometimes vegetarian     -Patient states that her mental health has been worsening recently  -Reports that she had a \"breakdown\" over the weekend, \"tried to hurt myself\"  -Doesn't feel like she can go to work right now, has anxiety over her work life  -Girlfriend but her in touch with [facility], plans to see a   -Patient does not have a Psychiatrist  -Reports hot flashes, is wondering if it is a result of her hormone medication, is currently taking Depotestosterone .4 mg injections weekly, has not missed a dose but notes that she has taken it a day late at times  -Is interested in outpatient care    Problem list and histories reviewed & adjusted, as indicated.  Additional history: as documented    ROS:  CONSTITUTIONAL: NEGATIVE for fever, chills, change in weight, POSITIVE hot flashes  INTEGUMENTARY/SKIN: NEGATIVE for worrisome rashes, moles or lesions  EYES: NEGATIVE for vision changes or irritation  ENT/MOUTH: NEGATIVE for ear, " mouth and throat problems  RESP: NEGATIVE for significant cough or SOB  BREAST: NEGATIVE for masses, tenderness or discharge  CV: NEGATIVE for chest pain, palpitations or peripheral edema  GI: NEGATIVE for nausea, abdominal pain, heartburn, or change in bowel habits  : NEGATIVE for frequency, dysuria, or hematuria  MUSCULOSKELETAL: NEGATIVE for significant arthralgias or myalgia  NEURO: NEGATIVE for weakness, dizziness or paresthesias  ENDOCRINE: NEGATIVE for temperature intolerance, skin/hair changes  HEME: NEGATIVE for bleeding problems  PSYCHIATRIC: POSITIVE for changes in mood or affect    This document serves as a record of the services and decisions personally performed and made by Pretty Estrada PA-C. It was created on her behalf by Agus Palacios, trained medical scribe. The creation of this document is based on the provider's statements to the medical scribe.  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    Social History     Tobacco Use     Smoking status: Never Smoker     Smokeless tobacco: Never Used   Substance Use Topics     Alcohol use: Yes     Comment: not often      No family history on file.        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    Social History     Tobacco Use     Smoking status: Never Smoker     Smokeless tobacco: Never Used   Substance Use Topics     Alcohol use: Yes     Comment: not often      No family history on file.      Current Outpatient Medications    Medication Sig Dispense Refill     ferrous sulfate (IRON) 325 (65 Fe) MG tablet Take 1 tablet (325 mg) by mouth daily (with breakfast) 90 tablet 2     gabapentin (NEURONTIN) 600 MG tablet Take 2 tablets (1200 mg) at bedtime as needed. 60 tablet 1     lamoTRIgine (LAMICTAL) 100 MG tablet Take 0.5 tablets two times daily 60 tablet 1     testosterone cypionate (DEPOTESTOSTERONE) 200 MG/ML injection INJECT 0.4MG BY INTRAMUSCULAR ROUTE EVERY WEEK  2     testosterone cypionate (DEPOTESTOSTERONE) 200 MG/ML injection INJECT 0.4MG BY INTRAMUSCULAR ROUTE EVERY WEEK  2       OBJECTIVE:                                                    /62   Pulse 70   Temp 98.6  F (37  C) (Oral)   Resp 14   Wt 60.8 kg (134 lb 1.6 oz)   SpO2 100%   BMI 21.00 kg/m   Body mass index is 21 kg/m .   GENERAL:: healthy, alert and no distress  NEURO: strength and tone- normal, sensory exam- grossly normal, mentation- intact, speech- normal, reflexes- symmetric  PSYCH: Alert and oriented times 3; speech- coherent , normal rate and volume; able to articulate logical thoughts, able to abstract reason, no tangential thoughts, no hallucinations or delusions, affect- blunted    No results found for this or any previous visit (from the past 24 hour(s)).       ASSESSMENT/PLAN:                                                        ICD-10-CM    1. Recurrent major depressive disorder, in partial remission (H) F33.41 MENTAL HEALTH REFERRAL  - Adult; Psychiatry and Medication Management; Psychiatry; [facility]: [facility] Psychiatry Service [phone].  Medication management & future refills will be returned to [facility] PCP upon completion of evaluation; Jose land...   2. Borderline personality disorder (H) F60.3 MENTAL HEALTH REFERRAL  - Adult; Psychiatry and Medication Management; Psychiatry; [facility]: [facility] [phone].  Medication management & future refills will be returned to [facility] PCP upon completion of evaluation;  "We emery...   3. 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  Medication management & future refills will be returned to [facility] PCP upon completion of evaluation; Jose emery...    Z79.899    4. At risk for intentional self-harm Z91.89 MENTAL HEALTH REFERRAL  - Adult; Psychiatry and Medication Management; Psychiatry; [facility]: [facility] Psychiatry Service [phone].  Medication management & future refills will be returned to [facility] PCP upon completion of evaluation; We emery...     Follow up with psychiatry ASAP. He met with Laura today and will be given resources for outpatient treatment. Contracts for safety today. The hormone fluctuation from dose adjustment of testosterone with associated hot flashes and menstrual cycle may largely be responsible for the severe episode of depression this weekend. Advised to contact his doctor that manages hormone today as well. Return to clinic for any new or worsening symptoms or go to ER Urgent care in off hours      Patient Instructions   Follow-up with psychiatry. If you haven't heard by the end of the day today to schedule, call Sandeep or Makenna at   Return to clinic for any new or worsening symptoms or go to ER Urgent care in off hours        Estimated body mass index is 21 kg/m  as calculated from the following:    Height as of 2/26/19: 1.702 m (5' 7\").    Weight as of this encounter: 60.8 kg (134 lb 1.6 oz).     The information in this document, created by the medical scribe for me, accurately reflects the services I personally performed and the decisions made by me. I have reviewed and approved this document for accuracy prior to leaving the patient care area.  May 2, 2019 7:22 AM    Pretty Pop [facility]  [facility]        "

## 2019-05-02 ENCOUNTER — TELEPHONE (OUTPATIENT)
Dept: BEHAVIORAL HEALTH | Facility: CLINIC | Age: 24
End: 2019-05-02

## 2019-05-02 ENCOUNTER — TELEPHONE (OUTPATIENT)
Dept: FAMILY MEDICINE | Facility: CLINIC | Age: 24
End: 2019-05-02

## 2019-05-02 ENCOUNTER — OFFICE VISIT (OUTPATIENT)
Dept: FAMILY MEDICINE | Facility: CLINIC | Age: 24
End: 2019-05-02
Payer: COMMERCIAL

## 2019-05-02 VITALS
HEART RATE: 70 BPM | DIASTOLIC BLOOD PRESSURE: 62 MMHG | OXYGEN SATURATION: 100 % | RESPIRATION RATE: 14 BRPM | WEIGHT: 134.1 LBS | TEMPERATURE: 98.6 F | BODY MASS INDEX: 21 KG/M2 | SYSTOLIC BLOOD PRESSURE: 108 MMHG

## 2019-05-02 DIAGNOSIS — R45.89 AT RISK FOR INTENTIONAL SELF-HARM: ICD-10-CM

## 2019-05-02 DIAGNOSIS — F60.3 BORDERLINE PERSONALITY DISORDER (H): ICD-10-CM

## 2019-05-02 DIAGNOSIS — F33.41 RECURRENT MAJOR DEPRESSIVE DISORDER, IN PARTIAL REMISSION (H): Primary | ICD-10-CM

## 2019-05-02 PROCEDURE — 99214 OFFICE O/P EST MOD 30 MIN: CPT | Performed by: PHYSICIAN ASSISTANT

## 2019-05-02 NOTE — TELEPHONE ENCOUNTER
Leticia,    Please see phone message from patient. Cued letter. Please review/sign or advise.    Odessa Patiño RN  [facility]

## 2019-05-02 NOTE — PATIENT INSTRUCTIONS
Follow-up with psychiatry. If you haven't heard by the end of the day today to schedule, call Sandeep Mensah at   Return to clinic for any new or worsening symptoms or go to ER Urgent care in off hours

## 2019-05-02 NOTE — LETTER
[place] [id number] [place] [address]  [facility] [address]  Phone: [phone]  Fax: [phone]    19    Sally Sanchez  [address] [facility]    [facility] [address]      To Whom It May Concern:      Re: Sally Sanchez (: 1995)    Sally is a patient that was seen by myself in the clinic today (19). Please excuse Sally from work the following dates: 19, 19, and 19.    Please contact me with any further questions.         Sincerely,          Pretty Estrada PA-C

## 2019-05-02 NOTE — TELEPHONE ENCOUNTER
Pt is requesting a letter excusing him from work 4/29, 4/30 and 5/3.    Call pt when ready [phone] joanne

## 2019-05-02 NOTE — TELEPHONE ENCOUNTER
Checked in briefly with pt per provider request this morning. He states he is doing better and denied current SI/SIB thoughts or urges. Pt is interested in outpatient treatment for depression, and agreed to a follow up phone call later today to discuss needs/options.    Called pt at 4:30 pm. He was busy and requested a call back later. Advised I will call tomorrow morning at 8:30.

## 2019-05-03 ENCOUNTER — TELEPHONE (OUTPATIENT)
Dept: BEHAVIORAL HEALTH | Facility: CLINIC | Age: 24
End: 2019-05-03

## 2019-05-03 DIAGNOSIS — F33.41 RECURRENT MAJOR DEPRESSIVE DISORDER, IN PARTIAL REMISSION (H): Primary | ICD-10-CM

## 2019-05-03 NOTE — TELEPHONE ENCOUNTER
Received day tx referral from Gene Neil through the [id number] work queue.    Referral made, bens sent, pool message sent to program

## 2019-05-03 NOTE — PROGRESS NOTES
P/C with pt per provider request to follow up on tx needs. Patient reports he has been cycling through episodes of depression and anger since childhood. He is experiencing this more intensely lately, and self-harmed within the past week. He denies current SI/SIB thoughts or urges. He is aware that hormone treatment may be intensifying how he experiences anger, though recognizes this as a familiar pattern.  He continues to work part time, has a supportive partner, and sees a therapist at the [facility].  Patient is interested in an outpatient program with a DBT/skills focus. [facility] and advised he will be contacted to schedule an intake/assessment.

## 2019-05-08 NOTE — TELEPHONE ENCOUNTER
5-8-19 Recv'd another MH OP Referral in Work Que.  Deferred as client is scheduled for MH OP DA. fb

## 2019-05-11 DIAGNOSIS — F33.41 RECURRENT MAJOR DEPRESSIVE DISORDER, IN PARTIAL REMISSION (H): ICD-10-CM

## 2019-05-13 NOTE — TELEPHONE ENCOUNTER
"Requested Prescriptions   Pending Prescriptions Disp Refills     lamoTRIgine (LAMICTAL) 100 MG tablet [Pharmacy Med Name: LAMOTRIGINE 100MG TABLETS] 90 tablet 1     Sig: TAKE 1/2 TABLET BY MOUTH TWICE DAILY       Anti-Seizure Meds Protocol  Failed - 5/11/2019 10:33 AM        Failed - Review Authorizing provider's last note.      Refer to last progress notes: confirm request is for original authorizing provider (cannot be through other providers).     Last Written Prescription Date:  2/18/2019  Last Fill Quantity: 60,  # refills: 1   Last office visit: 5/2/2019 with prescribing provider:  MAYNOR Estrada  Future Office Visit:         Failed - Normal CBC on file in past 26 months     Recent Labs   Lab Test 09/18/18  1111   WBC 6.4   RBC 4.91   HGB 16.2*   HCT 45.7                    Passed - Recent (12 mo) or future (30 days) visit within the authorizing provider's specialty     Patient had office visit in the last 12 months or has a visit in the next 30 days with authorizing provider or within the authorizing provider's specialty.  See \"Patient Info\" tab in inbasket, or \"Choose Columns\" in Meds & Orders section of the refill encounter.              Passed - Normal ALT or AST on file in past 26 months     Recent Labs   Lab Test 09/18/18  1111   ALT 22     Recent Labs   Lab Test 09/18/18  1111   AST 25             Passed - Normal platelet count on file in past 26 months     Recent Labs   Lab Test 09/18/18  1111                  Passed - Medication is active on med list        Passed - No active pregnancy on record        Passed - No positive pregnancy test in last 12 months          "

## 2019-05-13 NOTE — TELEPHONE ENCOUNTER
Leticia,  Please review/sign or advise for refill request of: lamoTRIgine (LAMICTAL) 100 MG tablet     Routing refill request to provider for review/approval because:  Labs out of range - abnormal HgB 16.2  Associated diagnosis not on [facility] refill protocol: Recurrent major depressive disorder, in partial remission (H) [F33.41]     LOV: 05/02/2019    Thank You!  Morena Vasques, RN  Triage Nurse

## 2019-05-14 RX ORDER — LAMOTRIGINE 100 MG/1
TABLET ORAL
Qty: 90 TABLET | Refills: 1 | Status: SHIPPED | OUTPATIENT
Start: 2019-05-14 | End: 2019-06-27 | Stop reason: ALTCHOICE

## 2019-05-15 ENCOUNTER — BEH TREATMENT PLAN (OUTPATIENT)
Dept: BEHAVIORAL HEALTH | Facility: CLINIC | Age: 24
End: 2019-05-15
Attending: PSYCHIATRY & NEUROLOGY

## 2019-05-15 ENCOUNTER — HOSPITAL ENCOUNTER (OUTPATIENT)
Dept: BEHAVIORAL HEALTH | Facility: CLINIC | Age: 24
Discharge: HOME | End: 2019-05-15
Attending: PSYCHIATRY & NEUROLOGY | Admitting: PSYCHIATRY & NEUROLOGY
Payer: COMMERCIAL

## 2019-05-15 DIAGNOSIS — F32.9 MAJOR DEPRESSIVE DISORDER: ICD-10-CM

## 2019-05-15 PROCEDURE — 90791 PSYCH DIAGNOSTIC EVALUATION: CPT | Performed by: SOCIAL WORKER

## 2019-05-15 ASSESSMENT — PAIN SCALES - GENERAL: PAINLEVEL: NO PAIN (0)

## 2019-05-15 NOTE — PROGRESS NOTES
Acknowledgement of Current Treatment Plan       I have reviewed my treatment plan with my therapist / counselor on 5/15/2019. I agree with the plan as it is written in the electronic health record.    Name Signature   Sally Sanchez    Name of Therapist / Counselor    Bethany Andino, MSASHA, [facility]

## 2019-05-15 NOTE — TELEPHONE ENCOUNTER
5-15-19 Scheduled 3 days, Placed in  Auth folder to get [facility] Auth before start. fb    ----- Message from ROS Muller sent at 5/15/2019  3:33 PM CDT -----  Regarding: new day treatment start  Sally Sanchez will start the Adult Day Treatment program on Monday 5/20/19 in schedule 5C (Mon, Tues, Thurs.) 1 pm to 4pm.  Dr. Beasley will be the psychiatrist.  Darion Beasley will be the psychotherapist.    [facility] is the insurance.  Authorization is needed.

## 2019-05-15 NOTE — PROGRESS NOTES
" Standard Diagnostic Assessment     CLIENT'S NAME: Sally Sanchez  MRN:   [id number]  :   1995 AGE:23 year old SEX: female at birth, identifies as male.  Prefers He/Him/His pronouns.  ACCT. NUMBER: [id number]  DATE OF SERVICE: 5/15/19 Start Time:  915 End Time:      Home Phone [phone]   Work Phone Not on file.   Mobile [phone]     Preferred Phone: as above  May we leave a program related message? yes    Yes, the patient has been informed that any other mental health professional providing mental health services to me will need access to this Diagnostic Assessment in order to develop a treatment plan and receive payment.     Identifying Information:  Sally Sanchez is a 23 year old,  person.  aSlly was assigned female gender at birth and now identifies as male.  He prefers He/Him/His pronouns. Sally attended the DA alone.  Sally's girlfriend attended the first part of the interview to learn about the program.     Reason for Referral: Sally was referred to Day Treatment (DT)  by SHAY David, [facility]. Sally reports the reason for referral at this time is \"Two or three weeks go I was in a place where I was not functioning well.  I came in to see her and asked for additional help.  She referred me here.  I was having heavy depression, lots of anger, dissociating, and not functioning.  I was not going to work. I was having suicidal thoughts and was hitting myself in the face.    Sally verbalizes the following treatment/discharge goals: \"I just want to address this cycle and break it up and try to find some coping mechanisms that actually work\".    Current Stressors/Losses/Disappointments: Being very broke financially.  Loss of income when I am unable to work.  Sick time is not available a my job.  I lost my job at [employer] due to inability to attend.  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    Per " Client, Review of Symptoms:  Mood (Depression/Anxiety/Anel/Anger): depressed mood, hopelessness, helplessness, feelings of worthlessness, irritability, low interest in activities, low self-confidence, nervousness, and fear of leaving the house.  The fear of leaving the house came after my girlfriend and I were homeless a couple of years ago.  Every since were homeless it has been difficult to leave the home.    Thoughts:  Ruminative thoughts that won't leave my mind such as finances and work issues.   Concentration/Memory: trouble concentrating, difficulty keeping track of things (dates, appointments)   Appetite/Weight: (see also, Physical Health Screening below) okay, using marijuana to have an appetite and help with functioning   Sleep: sleeping too much.  I could sleep forever if I have the opportunity.  I usually sleep bout 7 hours per night.  I will otherwise sleep 10 or 11 hours.    Motivation/Energy: low energy  Behavior: self injurious behaviors of hitting my face, staying busy to distract from the past, anger outbursts  Psychosis:  Mostly negative self-talk.  Union voices at age 10.  Family took client to a psychic for treatment.  No voices since that time.  Trauma: client reports that having his Dad stop being involved in his life was traumatic.   See family section for additional details.  Other: Housing stress over fear of losing apartment, work status, financial stress, relationship stress in terms of keeping up with people and maintaining friends and family.    Mental Health History:  Sally reports first onset of mental health symptoms Anger happened in childhood.  Intense anger.  Feeling depression symptoms started in high school.  Sally was first diagnosed In college at age 18 or 19.   Sally received the following mental health services in the past: case management, counseling, primary care behavioral health provider and psychiatry.  Client thinks that he is being referred to [facility]  Psychiatry service for psychiatry care.  Client is interested in finding a therapist.  Psychiatric Hospitalizations: None.   Sally denies a history of civil commitment.      Onset/Duration/Pattern of Symptoms noted above: Pattern of feel decent and that I can handle my life, then I will feel less and less interested, then gradually I feel bored and think that nothing has meaning then get angry.   If I do not hurt myself the anger will be ongoing.     Sally reports the following understanding of his diagnosis: Past diagnosis of Major Depressive Disorder.  Unsure if this is correct as I have lots of anger.  He thinks that borderline personality disorder is the primary diagnosis.      Personal Safety:    Carey-Suicide Severity Rating Scale   Suicide Ideation   1.) Have you ever wished you were dead or that you could go to sleep and not wake up?     Lifetime: Yes, (if yes, please describe) : I wish I was not here any more.  I wish something would happen to me such as being hit by a car so I did not have to be alive.  Whatever way I could do it so it would not be my fault.  I started having these thoughts when I was 12. Past Month:  Yes, (if yes, please describe) : Same as thoughts as in number 1.      2.) Have you actually had any thoughts of killing yourself?   Lifetime:  Yes, (if yes, please describe) : Thinking about taking a bunch a pill.  I never had the right kind.  I never had the option.  I have thought about other methods but dismissed them due to lack of effectiveness. Past Month:  Yes, (if yes, please describe) : Same thoughts as in number 2,   3.) Have you been thinking about how you might do this?     Lifetime:  Yes, (if yes, please describe) : No current image at this time.  But I think it would be nice to die.  I would like to be dead so whatever it takes to get there would be fine.   Past Month:  Yes, (if yes, please describe) : Same as number 3.   4.) Have you had these thoughts and had some  intention of acting on them?     Lifetime:  No Past Month:  No   5.) Have you started to work out the details of how to kill yourself?   Lifetime:  Yes, (if yes, please describe) : I was just going to save any pills I got and buy whatever would assist that.  Being financially unstable does not make it easy to do that. Past Month:  No   6.) Do you intend to carry out this plan?      Lifetime:  No Past Month:  No   Intensity of Ideation   Intensity of ideation (1 being least severe, 5 being most severe):     Lifetime:  3, description of Ideation: moderate                                                                                                Past Month:  3, description of Ideation: moderate   How often do you have these thoughts? Many times a day    When you have the thoughts how long do they last?  Fleeting - few seconds or minutes   Can you stop thinking about killing yourself or wanting to die if you want to?  Does not attempt to control thoughts    Are there things - anyone or anything (i.e. family, Amish, pain of death) that stopped you from wanting to die or acting on thoughts of suicide?  Protective factors definitely stopped you from attempting suicide   Assessment of the plan being insufficient stops me from acting.       What sort of reasons did you have for thinking about wanting to die or killing yourself (i.e. end pain, stop how you were feeling, get attention or reaction, revenge)?  Mostly to end or stop the pain (you couldn't go on living with the pain or how you were feeling)   Suicidal Behavior   (Suicide Attempt) - Have you made a suicide attempt?     Lifetime:  No Past Month: No   Have you engaged in self-harm (non-suicidal self-injury)?  Yes, (if yes, please describe) : punching self in face, hitting self- anywhere, used to cut self but have not done that since high school   (Interrupted Attempt) - Has there been a time when you started to do something to end your life but someone or  something stopped you before you actually did anything?  No   (Aborted or Self-Interrupted Attempt) - Has there been a time when you started to do something to try to end your life but you stopped yourself before you actually did anything?  No   (Preparatory Acts of Behavior) - Have you taken any steps towards making suicide attempt or preparing to kill yourself (such as collecting pills, getting a gun, giving valuables away or writing a suicide note)? No   Actual Lethality/Medical Damage:   0. No physical damage or very minor physical damage (e.g., surface scratches).   1. Minor physical damage (e.g., lethargic speech; first-degree burns; mild bleeding; sprains).  2. Moderate physical damage; medical attention needed (e.g., conscious but sleepy, somewhat responsive; second-degree burns; bleeding of major vessel).  3. Moderately severe physical damage; medical hospitalization and likely intensive care required (e.g., comatose with reflexes intact; third-degree burns less than 20% of body; extensive blood loss but can recover; major fractures).  4. Sever physical damage; medical hospitalization with intensive care required (e.g., comatose without reflexes; third-degree burs over 20% of body; extensive blood loss with unstable vital sign; major damage to a vital area).  5. Death    Attempt Date / Enter Code: No attempts. /          2008  [employer]  Used with permission by Paz Melendez, PhD.               Guide to C-SSRS Risk Ratings   NO IDEATION:  with no active thoughts IDEATION: with a wish to die. IDEATION: with active thoughts. Risk Ratings   If Yes No No 0 - Very Low Risk   If NA Yes No 1 - Low Risk   If NA Yes Yes 2 - Low/moderate risk   IDEATION: associated thoughts of methods without intent or plan INTENT: Intent to follow through on suicide PLAN: Plan to follow through on suicide Risk Ratings cont...   If Yes No No 3 - Moderate Risk   If Yes Yes No 4 - High Risk   If  Yes Yes Yes 5 - High Risk   The patient's ADDITIONAL RISK FACTORS and lack of PROTECTIVE FACTORS may increase their overall suicide risk ratings.      Additional Risk Factors:    No additional risk factors   Protective Factors: Positive social support Mike      Risk Status   Risk Rating: 3-Moderate risk  DA Staff:  SBAR to Tx team.    There was no additional information to provide at this time.  Please see the above suicide risk rating information.       Additional Safety Questions:    Do you have a gun, weapons or other means (including medications) to harm yourself available to you? No   Do you take chances with your safety?   yes, Alcohol.    How do you understand this?  Impulse.   Have you ever thought about killing someone else? No   Have you ever heard voices telling you to harm yourself or others? No       Supports:   From whom do you receive support and how often? (family/friends/agency) Girlfriend, Mom     Do your support people want/need education/resources? no        Is there anything in your life (current or history) that is satisfying to you (include leisure interests/hobbies)?   no      Hope/Belief System:  Do you believe things can get better? yes unsure.       Personal Safety Summary:          Sally reports the following current fears or concerns for personal safety: daily passive fleeting suicidal ideation.  client has some intermittent self-injury..    Completed safety coping plan? yes        Substance Use History:       Substance: Hx of Use/Abuse: Last Use: Pattern of Use:   Alcohol no One week ago 2 or three shots of liquor last weekend over four days, but otherwise can go months in between use.   Cannabis yes helped me through a lot of things but I would rather have a different coping skills that does not siphon so much money Daily use Three grams a day about   Street Drugs no     Prescription Drugs no     Other no       Substance Use Disorder Treatment: Sally is currently receiving  the following services: Client has not attended chemcial health treatment previously.  He is using marijuana daily.       CAGE-AID:  Have you ever felt you ought to cut down on your drinking or drug use?   Yes    Have people annoyed you by criticizing your drinking or drug use?   No    Have you ever felt bad or guilty about your drinking or drug use?   Yes    Have you ever had a drink or used drugs first thing in the morning to steady your nerves or to get rid of a hangover?  No    Do you feel these issues have been adequately addressed? No If no, are you ready to address them now? Would like to learn more coping options.    Chemical Dependency Assessment Recommended?  No Client is not interested in abstaining from use at this time.  He stated that he might consider quitting in the future.       Sally has a positive Cage-Aid score.   Sally has not received chemical dependency treatment in the past.    Sally reports the following life areas have been negatively impacted by his substance use:  financial.   Sally reports his substance use and mental health have influenced each other in the following way(s): helpful to him to manage emotions.   Sally does not currently consider his substance use to be a problem.     Sally does not report a goal to be abstinent. Maybe in the future.   Sally denies difficulty discontinuing use. Client stated that he abstained for 10 days earlier this year and abstained for one year in high school.  Sally reports the following period(s) of abstinence Lifetime:  abstained for 10 days earlier this year and abstained for one year in high school..   Sally does not identify coping skills/strategies to prevent relapse of substance use or mental health instability. Client uses marijuana to manage emotions.     Prioritization Status:   Sally denies a history of substance use by injection. Sally denies current pregnancy.    Discussed the general effects of drugs and alcohol on health and  well-being.     Contraindicated Medication Use:   Sally does not currently take stimulant, benzodiazapine and/or narcotic medications.      does not plan to consult with a Lead Psychotherapist and/or a Licensed Alcohol and Drug Counselor prior to making further substance use treatment recommendations.      Legal History:    Sally reports that he has been involved with the legal system. 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   Client stated that it was a mistake and it was a sting operation by police.  He stated that he was charges with possession of marijuana but just had to pay a fee.    ________________________________________________________________________    Life Situation (Employment/School/Finances/Basic Needs):  Sally  is currently living with girlfriend in a apartment.   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   Sally describes a work Hx of [employer], [employer], grocery stores   Sally reports finances are obtained through Employment  Sally does identify her finances as a current stressor. Difficulty paying rent. Sally denies a history of gambling and denies a history of gambling treatment.     Sally reports her highest level of education is some college  Sally did not identify any learning problems   Sally describes academic performance as: average.  It goes with my mental health cycles.   Sally describes school social experience as: okay until my mental health started becoming a problem.     Sally reports concerns regarding her current ability to meet basic needs. Referred to apply for food support.  Discussed food shelf options.    Social/Family History:   Sally  reports she grew up in [place], [place].   Sally was the only child.  Sally reports her biological parents are Not  and not together.    Sally  describes her childhood as alright.  Financially supported.  Only child.  Dad stopped being in my life when I was about 5.    Sally describes her current relationships with her family of origin as Relationship with Mom is pretty good. I check in pretty often.  I started medically gender transitioning two years ago.  She has been fine with my gender transition.       aSlly identifies her relationship status as: partnered / significant other. [place].   Sally identifies her sexual orientation as: Current partner is female.  It does not matter.   Sally denies sexual health concerns.     Sally reports having 0 children.     Sally describes the quantity/quality of her social relationships as pretty much [place] and my Mom.  My friends do not live here and we do not talk about anything real when we do talk.    Significant Losses / Trauma / Abuse / Neglect Issues / Developmental Incidents:  Sally reports significant loss/trauma/abuse/neglect issues/developmental incidents Dad randomly left when I was 5.   He stopped paying child support and left.    Sally reports Dad left family and stopped paying child support when client was 5.  The parents never lived together.   Sally has not addressed the above concerns in previous therapy/treatment     Sally denies personal  experience.     [facility] Preference/Spiritual Beliefs/Cultural Considerations:     A. Ethnic Self-Identification:  Sally self-identifies his race/ethnicities as:  and his preferred language to be English.   Sally reports he does not need the assistance of an . Sally  reports he does not need other support or modifications involved in therapy.      B. Do you experience cultural bias (the practice of interpreting judging behavior by standards inherent to one's own culture) by other people as a stressor? If yes, describe how this relates to overall mental health symptoms.  Yes - describe:  Attitudes of people    C. Are there any  cultural influences that may need to be considered for your treatment?  (This includes historical, geographical and familial factors that affect assessment and intervention processes). Yes, Assistance from team in case of negative behavior from others in the program.    Strengths/Vulnerabilities:    Sally identifies her personal strengths as: creative, good listener, support of family, friends and providers and willing to relate to others .   Things that may interfere with the clients success in treatment include: none.   Other identified areas of vulnerability include: Suicidal Ideation  Depressive symptoms  Other: self-injurious behavior.     Medical History / Physical Health Screen:     Primary Care Physician: Sally has a [place] Primary Care Provider, who is named SHAY David. Clinic, [facility]..   Last Physical Exam: within the past year. Client was encouraged to follow up with PCP if symptoms were to develop.    Mental Health Medication Management Provider / Psychiatrist: Sally reports not having a psychiatrist.  Referral is at home.   Last visit: 5/2/19        Next visit: unsure    Current medications including prescription, non-prescription, herbals, dietary aids and vitamins:  Per client report:   Outpatient Medications Marked as Taking for the 5/15/19 encounter (Hospital Encounter) with [facility]   Medication Sig     lamoTRIgine (LAMICTAL) 100 MG tablet TAKE 1/2 TABLET BY MOUTH TWICE DAILY     testosterone cypionate (DEPOTESTOSTERONE) 200 MG/ML injection INJECT 0.4MG BY INTRAMUSCULAR ROUTE EVERY WEEK       Sally reports current medications are: Unsure of whether they are effective or not.  .   Sally describes taking her medications as: Independent.  Sally reports taking prescribed medications as prescribed.     Sally provides the following current assessment of pain:  ; Pain Score: No Pain (0);  .     Sally provides the following information regarding past  significant medical conditions/diagnoses:      Medical:  Past Medical History:   Diagnosis Date     Mood disorder (H)        Surgical:  No past surgical history on file.  Allergy:   Sally reports   Allergies   Allergen Reactions     Sulfa Drugs Rash        Family History of Medical, Mental Health and/or Substance Use problems:  Per client report: No family history on file.    Sally reports no current medical concerns.      General Health:   Have you had any exposure to any communicable disease in the past 2-3 weeks? no     Are you aware of safe sex practices? yes     Is there a possibility of pregnancy?  no       Nutrition:    Are you on a special diet? If yes, please explain:  yes vegetarian   Do you have any concerns regarding your nutritional status? If yes, please explain:  no   Have you had any appetite changes in the last 3 months?  No     Have you had any weight loss or weight gain in the last 3 months?  No     Do you have a history of an eating disorder? no   Do you have a history of being in an eating disorder program? no   Do you have any dental concerns? no   NOTE: BMI to be calculated following program admission.    Fall Risk:   Have you had any falls in the past 3 months? no     Do you currently use any assistive devices for mobility?   no     NOTE: If client reports 3 or more falls in the past 3 months, the client will not be accepted into the program until further assessment is completed by the program nurse. Check if a nurse is available to assess at time of DA.    NOTE: If client reports 2 falls in the past 3 months and/or the client currently uses assistive devices for mobility, the  will send an in-basket to the program nurse to meet with the client within the first week of programming.    Head Injury/Trauma:   Do you have a history of head injury / trauma? no     Do you have any cognitive impairment? no       Per completion of the Medical History / Physical Health Screen, is there a  recommendation to see / follow up with a primary care physician/clinic or dentist?    No.      Clinical Findings     Mental Status Assessment/Clinical Observation:  Appearance:   awake, alert, adequately groomed and appeared older than stated age  Eye Contact:   good  Psychomotor Behavior: Normal  no evidence of tardive dyskinesia, dystonia, or tics  Attitude:   Cooperative    Oriented to:   All    Speech   Rate / Production: Normal    Volume:  Normal   Mood:    Depressed     Affect:    Appropriate      Thought Content:  Rumination  passive suicidal ideation present  Thought Form:  logical, linear and goal oriented no loose associations  Insight:    good    Judgment:     intact  Attention Span/Concentration: intact  Recent and Remote Memory:  intact      Psychiatric Diagnosis:    296.32 (F33.1) Major Depressive Disorder, Recurrent Episode, Moderate _  301.83 (F60.3) Borderline Personality Disorder    Provisional Diagnostic Hypothesis (Explain R/O, other Provisional Diagnosis, and why alternative Diagnosis that were considered were ruled out):   none    Medical Concerns that may Impact Treatment:   None    Psychosocial and Contextual Factors (V-Codes):  Underemployed, lack of adequate finances    WHODAS 2.0 SCORE: 29/94 %      Client and family participation in assessment:   Salyl was alone during this assessment.   His girlfriend attended the discussion of the program overview.  This assessment does include collateral information. Records in Epic.     Summary & Recommendations  Provide a brief summary of how diagnostic criteria is met (symptoms, duration & functional impairment), cause, prognosis, and likely consequences of symptoms. Include overview of pertinent client strengths, cultural influences, life situations, relationships, health concerns and how diagnosis interacts/impacts with client's life. Recommendations include: client preferences, prioritization of needed mental health, ancillary or other services  and any referrals to services required by statute or rule.     Sally Sanchez is a 23 year old,  person.  Sally was assigned female gender at birth and now identifies as male.  He prefers He/Him/His pronouns. Sally attended the DA alone.  Sally's girlfriend attended the first part of the interview to learn about the program.  Client lists his girlfriend, [place], and his Mom as his support people.    Sally reported a history of depression with depressed mood, anger, recent history of hitting himself, and passive suicidal ideation daily.  Salyl reports a high level of financial stressors due to job losses and low income.  He stated that he and his girlfriend were homeless for about two years and now that they have an apartment they are struggling to maintain rent payments.      Sally believes that he has depression and borderline personality disorder based on discussions with past psychiatry providers.  Sally is interested in obtaining additional help to break the cycle of distress.  Sally reports using marijuana daily to help him cope.  He is not ready to change the daily use at this time, but is interested in changing this in the future.      Prognosis is Good. Without the recommended intervention, the client is likely to experience the following consequences of their symptoms: possible need to use inpatient care.    Referrals to services required by statute or rule:   Report to child/adult protection services was NA.   Was/were discussed and client will pursue.  Client is interested in obtaining a psychiatrist and believes that the [facility]  made a referral.  He is also interested in individual therapy.    Program Recommendation: Day Treatment (DT) .  5C    Assessment Completed by: Bethany Andino

## 2019-05-15 NOTE — PROGRESS NOTES
"Initial Individual Treatment Plan     Patient: Sally Sanchez   MRN: [id number]  : 1995  Age: 23 year old  Sex: female    Diagnostic Assessment Date / Date of Initial Individual Treatment Plan: 5/15/19      Immediate Health Concerns:  No     Immediate Safety Concerns:  Yes. Identify safety concern and plan to address: passive suicidal ideation daily.    Identify the issues to be addressed in treatment:  Symptom Management, Personal Safety and Community Resources/Discharge Planning    Client Initial Individualized Goals for Treatment:  \"I just want to address this cycle and break it up and try to find some coping mechanisms that actually work\".    Initial Treatment suggestions for the client during the time between Diagnostic Assessment and completion of the Individualized Treatment Plan:  Follow Safety Plan   Ask for more information, support and/or assistance as needed.  Follow up with providers/community supports as needed: Interested in psychiatry.  Interested in individual therapy.  Report increases or changes in symptoms to staff.  Report any personal safety concerns to staff.   Take medications as prescribed.  Report medication changes and/or side effects to staff.  Attend and participate in groups as scheduled or notify staff if unable to do so.  Report any use of substances to staff as this may impact your symptoms and/or personal safety.  Notify staff if you have any other issues that need to be addressed. This may include any current abuse / neglect / exploitation or other vulnerability.  Follow recommendations of your treatment team and discuss concerns if not in agreement.     Treatment Team Responsible: Day Treatment (DT)      Therapeutic Interventions/Treatment Strategies may include:  Support, Redirection, Feedback, Limit/Boundaries, Safety Assessments, Structured Activity, Problem Solving, Clarification, Education, Motivational Enhancement and Relapse Prevention as needed.    Bethany Prado " Thu

## 2019-05-15 NOTE — PROGRESS NOTES
"Adult Outpatient Mental Health Programs    MY COPING PLAN FOR SAFETY    NAME: Sally Sanchez  MRN: [id number]  SAFETY PLAN:  Step 1: Warning signs / cues (Thoughts, images, mood, situation, behavior) that a crisis may be developing:    Thoughts: \"Nothing makes it better\" and \"Nothing is worth it it, even even if I try to fix things.\"    Images: none    Thinking Processes: intrusive thoughts (bothersome, unwanted thoughts that come out of nowhere): not being able to get anything together.    Mood: intense anger    Behaviors: not taking care of myself and not taking care of my responsibilities    Situations: financial stress   Step 2: Coping strategies - Things I can do to take my mind off of my problems without contacting another person (relaxation technique, physical activity):    Distress Tolerance Strategies:  intense exercise: basketball     Physical Activities: basketball, going for walks    Focus on helpful thoughts:  \"This is temporary\"  I want to focus on addressing the financial problems so it can be less of a stressor.  Step 3: People and social settings that provide distraction:   Name: [place] Phone:    Name: Mom Phone:     unsure   Step 4: Remind myself of people and things that are important to me and worth living for:  Rajiv Arias  Step 5: When I am in crisis, I can ask these people to help me use my safety plan:   Name: [place] Phone:    Name: Mom Phone:   Step 6: Making the environment safe:     not keeping extra medications at home  Step 7: Professionals or agencies I can contact during a crisis:    Suicide Prevention Lifeline: 4-471-059-TALK (3987)    Crisis Text Line Service (available 24 hours a day, 7 days a week): Text [place] to 899519    Call  **CRISIS (088545) from a cell phone to talk to a team of professionals who can help you.  Crisis Services By County: Phone Number:   [place]     [phone]   [place]    [phone]   [place]    [phone]   [place]    [phone]   [place]    [phone] "   [place] [phone]   [place]     [phone]       Call 911 or go to my nearest emergency department.     I helped develop this safety plan and agree to use it when needed.  I have been given a copy of this plan.      Client signature _________________________________________________________________  Today s date:  5/15/2019  Adapted from Safety Plan Template 2008 Liza Lynn and Stanford Sevilla is reprinted with the express permission of the authors.  No portion of the Safety Plan Template may be reproduced without the express, written permission.  You can contact the authors at bhs@[place].[email or web address] or [email or web address].

## 2019-05-17 NOTE — TELEPHONE ENCOUNTER
5/17/19 Per Madalyn ([facility] [phone]), case will go to peer review for MH IOP start on 5/20/19 to be reviewed with program therapist today at 1:30 pm, left  for Bethany BERGMAN

## 2019-05-20 ENCOUNTER — TELEPHONE (OUTPATIENT)
Dept: BEHAVIORAL HEALTH | Facility: CLINIC | Age: 24
End: 2019-05-20

## 2019-05-20 NOTE — TELEPHONE ENCOUNTER
S-Spoke to Bethany MCGUIRE re: auth request for IOP.   B-She was not in office on Friday, 5/17. Per [facility] voicemail, auth request was denied due to no peer review competed.  A-Behtany is attempting to appeal this denial  R-Pt's start date has been delayed until authorization can be secured per Bethany.    5/17/19 Per [facility] ([facility] [phone]), case will go to peer review for  IOP start on 5/20/19 to be reviewed with program therapist today at 1:30 pm, left  for Bethany BERGMAN

## 2019-05-22 ENCOUNTER — TELEPHONE (OUTPATIENT)
Dept: BEHAVIORAL HEALTH | Facility: CLINIC | Age: 24
End: 2019-05-22

## 2019-05-23 ENCOUNTER — HOSPITAL ENCOUNTER (EMERGENCY)
Facility: CLINIC | Age: 24
Discharge: HOME | End: 2019-05-23
Attending: EMERGENCY MEDICINE | Admitting: PSYCHIATRY & NEUROLOGY

## 2019-05-23 VITALS
OXYGEN SATURATION: 98 % | RESPIRATION RATE: 16 BRPM | HEART RATE: 54 BPM | BODY MASS INDEX: 20.83 KG/M2 | SYSTOLIC BLOOD PRESSURE: 104 MMHG | WEIGHT: 133 LBS | TEMPERATURE: 97.9 F | DIASTOLIC BLOOD PRESSURE: 54 MMHG

## 2019-05-23 DIAGNOSIS — F64.9 GENDER DYSPHORIA: ICD-10-CM

## 2019-05-23 DIAGNOSIS — F12.90 MARIJUANA USE, CONTINUOUS: ICD-10-CM

## 2019-05-23 DIAGNOSIS — F60.3 BORDERLINE PERSONALITY DISORDER (H): ICD-10-CM

## 2019-05-23 DIAGNOSIS — F43.23 ADJUSTMENT REACTION WITH ANXIETY AND DEPRESSION: ICD-10-CM

## 2019-05-23 PROCEDURE — 99285 EMERGENCY DEPT VISIT HI MDM: CPT | Mod: 25 | Performed by: PSYCHIATRY & NEUROLOGY

## 2019-05-23 PROCEDURE — 90791 PSYCH DIAGNOSTIC EVALUATION: CPT

## 2019-05-23 PROCEDURE — 99283 EMERGENCY DEPT VISIT LOW MDM: CPT | Mod: Z6 | Performed by: PSYCHIATRY & NEUROLOGY

## 2019-05-23 ASSESSMENT — ENCOUNTER SYMPTOMS
HALLUCINATIONS: 0
EYES NEGATIVE: 1
HYPERACTIVE: 0
NEUROLOGICAL NEGATIVE: 1
CONSTITUTIONAL NEGATIVE: 1
RESPIRATORY NEGATIVE: 1
CARDIOVASCULAR NEGATIVE: 1
AGITATION: 0
NERVOUS/ANXIOUS: 1
MUSCULOSKELETAL NEGATIVE: 1
GASTROINTESTINAL NEGATIVE: 1
HEMATOLOGIC/LYMPHATIC NEGATIVE: 1

## 2019-05-23 NOTE — ED AVS SNAPSHOT
[facility], [place], Emergency Department  [address]  [place] [address]  Phone:  [phone]  Fax:  [phone]                                    Sally Sanchez   MRN: [id number]    Department:  [facility], Emergency Department   Date of Visit:  5/23/2019           After Visit Summary Signature Page    I have received my discharge instructions, and my questions have been answered. I have discussed any challenges I see with this plan with the nurse or doctor.    ..........................................................................................................................................  Patient/Patient Representative Signature      ..........................................................................................................................................  Patient Representative Print Name and Relationship to Patient    ..................................................               ................................................  Date                                   Time    ..........................................................................................................................................  Reviewed by Signature/Title    ...................................................              ..............................................  Date                                               Time          22EPIC Rev 08/18

## 2019-05-23 NOTE — TELEPHONE ENCOUNTER
Brianda from the [facility] called and is referring patient for Dual MH IOP. She will update Epic and have Dr Thompson put the MH referral order in.    Bethany Andino has faxed appeal to [facility] regarding the concerns of Dr Yeung (who denied initially).  Referral already created. [facility]

## 2019-05-23 NOTE — ED PROVIDER NOTES
"  History     Chief Complaint   Patient presents with     Suicidal     multple plans that come and go:     The history is provided by the patient.     Sally Sanchez is a 23 year old female to male gender dysphoric individual (\"Rodo\") who is here referred from his therapist's office for a \"wellness check.\" It was his first visit. Patient reports long history of chronic passive SI. He lived in [place] and moved to [place]. He currently lives with his girlfriend. He works on and off. Patient admits to being easily overwhelmed with life stressors which triggers acute sense of helplessness and hopelessness and suicidal thoughts. Patient denies intending on ending his life however. He last saw a therapist over a year ago. He decided to pursue help and got an appointment for therapy which was today. Patient also tried to get into a day treatment program and had an intake through [facility]. He was told that his insurance ([facility]) will not cover it. Patient presently is prescribed lamotrigine by a psychiatric provider in [place]. His primary care provider is maintain his dose here. He also is taking testosterone. He admits to smoking THC but no other drugs. Patient reports feeling safe going home. He would appreciate help with getting referred to a day treatment program.    Please see DEC Crisis Assessment on 5/23/19 in Epic for further details.    PERSONAL MEDICAL HISTORY  Past Medical History:   Diagnosis Date     Mood disorder (H)      PAST SURGICAL HISTORY  History reviewed. No pertinent surgical history.  FAMILY HISTORY  No family history on file.  SOCIAL HISTORY  Social History     Tobacco Use     Smoking status: Never Smoker     Smokeless tobacco: Never Used   Substance Use Topics     Alcohol use: Yes     Comment: not often      MEDICATIONS  No current facility-administered medications for this encounter.      Current Outpatient Medications   Medication     lamoTRIgine (LAMICTAL) 100 MG tablet     testosterone " cypionate (DEPOTESTOSTERONE) 200 MG/ML injection     ferrous sulfate (IRON) 325 (65 Fe) MG tablet     gabapentin (NEURONTIN) 600 MG tablet     testosterone cypionate (DEPOTESTOSTERONE) 200 MG/ML injection     ALLERGIES  Allergies   Allergen Reactions     Sulfa Drugs Rash         I have reviewed the Medications, Allergies, Past Medical and Surgical History, and Social History in the Epic system.    Review of Systems   Constitutional: Negative.    HENT: Negative.    Eyes: Negative.    Respiratory: Negative.    Cardiovascular: Negative.    Gastrointestinal: Negative.    Genitourinary: Negative.    Musculoskeletal: Negative.    Skin: Negative.    Neurological: Negative.    Hematological: Negative.    Psychiatric/Behavioral: Positive for suicidal ideas. Negative for agitation, behavioral problems and hallucinations. The patient is nervous/anxious. The patient is not hyperactive.    All other systems reviewed and are negative.      Physical Exam   BP: 94/50  Pulse: 52  Temp: 97.9  F (36.6  C)  Resp: 18  Weight: 60.3 kg (133 lb)  SpO2: 98 %      Physical Exam   Constitutional: She appears well-developed.   HENT:   Head: Normocephalic.   Eyes: Pupils are equal, round, and reactive to light.   Neck: Normal range of motion.   Cardiovascular: Normal rate.   Pulmonary/Chest: Effort normal.   Abdominal: Soft.   Musculoskeletal: Normal range of motion.   Neurological: She is alert.   Skin: Skin is warm.   Psychiatric: She has a normal mood and affect. Her speech is normal and behavior is normal. Judgment and thought content normal. She is not agitated, not aggressive, not hyperactive, not actively hallucinating and not combative. Thought content is not paranoid and not delusional. Cognition and memory are normal. She expresses no homicidal and no suicidal ideation.   Nursing note and vitals reviewed.      ED Course        Procedures               Labs Ordered and Resulted from Time of ED Arrival Up to the Time of Departure from  the ED - No data to display         Assessments & Plan (with Medical Decision Making)   Patient with gender dysphoria who has been feeling overwhelmed with life stressors which is making him feel helpless and hopeless. He is interested in getting intensive supportive program such as adult day treatment. He intends on continuing with therapy. Patient can be discharged. [facility] will help make a referral for Adult Day treatment. He is to follow-up established care and services.    I have reviewed the nursing notes.    I have reviewed the findings, diagnosis, plan and need for follow up with the patient.       Medication List      There are no discharge medications for this visit.         Final diagnoses:   Gender dysphoria   Adjustment reaction with anxiety and depression   Marijuana use, continuous       5/23/2019   [facility], [place], EMERGENCY DEPARTMENT     Hadley Cotton MD  05/23/19 [id number]

## 2019-05-23 NOTE — DISCHARGE INSTRUCTIONS
Please follow-up [facility]-referred day treatment program. You have been re-referred to [facility]'s day treatment program.  Consider [facility] for intensive day treatment as a secondary option.  Continue with therapy to develop healthy coping and resilience  Follow-up established care and services.

## 2019-05-24 NOTE — TELEPHONE ENCOUNTER
5/24/19 Received call from client stated he was seen in the ED last night, and wanted to know have an appeal been done with his insurance company to get into the program. Client was informed that the prior authorization would be obtained when he have been confirmed that he would be starting program. MADALYN

## 2019-05-29 ENCOUNTER — TELEPHONE (OUTPATIENT)
Dept: BEHAVIORAL HEALTH | Facility: CLINIC | Age: 24
End: 2019-05-29

## 2019-05-30 ENCOUNTER — TELEPHONE (OUTPATIENT)
Dept: BEHAVIORAL HEALTH | Facility: CLINIC | Age: 24
End: 2019-05-30

## 2019-05-30 NOTE — TELEPHONE ENCOUNTER
Received call from pt to follow-up on referral to OP.  Spoke with Bethany MCGUIRE, program staff.  She is awaiting call from [facility] regarding denial and will be in contact with pt once final decision has been made.  This was communicated to pt.  He reports he can also be reached on his girlfriend's cell, which is [phone] and voicemail can be left for him on this number. This number has been added to pt demographics.

## 2019-05-31 NOTE — TELEPHONE ENCOUNTER
----- Message from ROS Muller sent at 5/30/2019  4:48 PM CDT -----  Regarding: new day treatment start  Writer completed urgent appeal of IOP denial with Dr. Gupta from [employer] on 5/30/19.  Dr. Gupta authorized 12 days of service from 5/17/19-11/17/19.  Authorization number is [id number].  Call [phone] to request additional days.  Dr. Gupta stated that as IOP is an acute level of care please note progress on level of hypersomnia, level of suicidality, level of self-injurious behavior, and progress on strengthening discharge plan when calling.      Writer notified client of authorization.  Client will start the 5C schedule (M,T, TH from 1-4 pm) on Monday 6/3/19.  MAYCO Bowman, ROS

## 2019-06-03 ENCOUNTER — HOSPITAL ENCOUNTER (OUTPATIENT)
Dept: BEHAVIORAL HEALTH | Facility: CLINIC | Age: 24
End: 2019-06-03
Attending: PSYCHIATRY & NEUROLOGY
Payer: COMMERCIAL

## 2019-06-03 PROCEDURE — H2012 BEHAV HLTH DAY TREAT, PER HR: HCPCS

## 2019-06-03 ASSESSMENT — ANXIETY QUESTIONNAIRES
IF YOU CHECKED OFF ANY PROBLEMS ON THIS QUESTIONNAIRE, HOW DIFFICULT HAVE THESE PROBLEMS MADE IT FOR YOU TO DO YOUR WORK, TAKE CARE OF THINGS AT HOME, OR GET ALONG WITH OTHER PEOPLE: VERY DIFFICULT
3. WORRYING TOO MUCH ABOUT DIFFERENT THINGS: MORE THAN HALF THE DAYS
5. BEING SO RESTLESS THAT IT IS HARD TO SIT STILL: NEARLY EVERY DAY
2. NOT BEING ABLE TO STOP OR CONTROL WORRYING: MORE THAN HALF THE DAYS
1. FEELING NERVOUS, ANXIOUS, OR ON EDGE: MORE THAN HALF THE DAYS
6. BECOMING EASILY ANNOYED OR IRRITABLE: MORE THAN HALF THE DAYS
7. FEELING AFRAID AS IF SOMETHING AWFUL MIGHT HAPPEN: NEARLY EVERY DAY
GAD7 TOTAL SCORE: 16

## 2019-06-03 ASSESSMENT — PATIENT HEALTH QUESTIONNAIRE - PHQ9: 5. POOR APPETITE OR OVEREATING: MORE THAN HALF THE DAYS

## 2019-06-03 NOTE — PROGRESS NOTES
"Adult Mental Health Outpatient Group Therapy Progress Note     Client Initial Individualized Goals for Treatment: \"I just want to address this cycle and break it up and try to find some coping mechanisms that actually work\".    See Initial Treatment suggestions for the client during the time between Diagnostic Assessment and completion of the Master Individualized Treatment Plan.    Treatment Goals:  Follow Safety Plan   Ask for more information, support and/or assistance as needed.  Follow up with providers/community supports as needed: Interested in psychiatry.  Interested in individual therapy.  Report increases or changes in symptoms to staff.  Report any personal safety concerns to staff.   Take medications as prescribed.  Report medication changes and/or side effects to staff.  Attend and participate in groups as scheduled or notify staff if unable to do so.  Report any use of substances to staff as this may impact your symptoms and/or personal safety.  Notify staff if you have any other issues that need to be addressed. This may include any current abuse / neglect / exploitation or other vulnerability.  Follow recommendations of your treatment team and discuss concerns if not in agreement.                  Area of Treatment Focus:  Symptom Management and Personal Safety    Therapeutic Interventions/Treatment Strategies:  Support, Safety Assessments and Education    Response to Treatment Strategies:  Accepted Feedback, Gave Feedback, Listened, Focused on Goals, Attentive and Accepted Support     Name of Group: Psychotherapy  Date/Time: 6/3/19 / 1:00 to 1:50 pm and 3:00- 3:50 pm  Number of Group Participants: 5    Description and Outcome:  Hour one: Client introduced self using \"He/Him\" pronouns.  He passed when offered to take time.  He denied safety concerns and committed to following safety plan.    Client verbalized understanding of session content by stating plans to maintain safety.    Hour three: " Facilitator taught and led discussion on mindfulness skills including what it is (observe, describe, and participate) as well as how to do it (non-judgmentally, one mindfully, effectively).  Discussed barriers and provided suggestions for practicing it in daily life.  Engaged in brief mindfulness exercises.  Client was asked to participate in each exercise and talk about how the skill applies to their life/where they could use it more often.  Client participated actively and effectively in discussion    Client would benefit from additional opportunities to practice and implement content from this session.    Is this a Weekly Review of the Progress on the Treatment Plan?  Yes.      Are Treatment Plan Goals being addressed?  Yes, continue treatment goals      Are Treatment Plan Strategies to Address Goals Effective?  Yes, continue treatment strategies      Are there any current contracts in place?  No

## 2019-06-04 PROBLEM — F32.9 MAJOR DEPRESSIVE DISORDER: Status: ACTIVE | Noted: 2019-06-04

## 2019-06-04 ASSESSMENT — ANXIETY QUESTIONNAIRES: GAD7 TOTAL SCORE: 16

## 2019-06-06 ENCOUNTER — HOSPITAL ENCOUNTER (OUTPATIENT)
Dept: BEHAVIORAL HEALTH | Facility: CLINIC | Age: 24
End: 2019-06-06
Attending: PSYCHIATRY & NEUROLOGY
Payer: COMMERCIAL

## 2019-06-06 PROCEDURE — H2012 BEHAV HLTH DAY TREAT, PER HR: HCPCS

## 2019-06-06 NOTE — PROGRESS NOTES
"Adult Mental Health Outpatient Group Therapy Progress Note     Client Initial Individualized Goals for Treatment: \"I just want to address this cycle and break it up and try to find some coping mechanisms that actually work\".    See Initial Treatment suggestions for the client during the time between Diagnostic Assessment and completion of the Master Individualized Treatment Plan.    Treatment Goals:  Follow Safety Plan   Ask for more information, support and/or assistance as needed.  Follow up with providers/community supports as needed: Interested in psychiatry.  Interested in individual therapy.  Report increases or changes in symptoms to staff.  Report any personal safety concerns to staff.   Take medications as prescribed.  Report medication changes and/or side effects to staff.  Attend and participate in groups as scheduled or notify staff if unable to do so.  Report any use of substances to staff as this may impact your symptoms and/or personal safety.  Notify staff if you have any other issues that need to be addressed. This may include any current abuse / neglect / exploitation or other vulnerability.  Follow recommendations of your treatment team and discuss concerns if not in agreement.                  Area of Treatment Focus:  Symptom Management and Personal Safety    Therapeutic Interventions/Treatment Strategies:  Support, Safety Assessments and Education    Response to Treatment Strategies:  Accepted Feedback, Gave Feedback, Listened, Focused on Goals, Attentive and Accepted Support     Group:  Group Therapy Date and Time 6/6/2019 [id number] Group total: 4    Description and outcome:  Mental Health Professional checked it with group by asking them: how they are feeling, what goal they are working on, what skill will be used to reach their goal, what is something they are proud of, what might be a barrier to their goal, how the therapist can support them, and if they need additional time to process.   " Psychotherapist encouraged group to support each other by providing feedback as appropriate.   Rodo checked in and reports feeling overwhelmed today, he is worried about work and and wanting to figure out the next steps for a more stable job, he was receptive to feedback about options and discussed his goal of getting outside more often.     Therapist validated and normalized emotions.  Pt accepted feedback from others and provided appropriate feedback to others in the group.    Pt did not endorse safety concerns at this time.    Pt reported taking all medications as prescribed.     Pt verbalized understanding of the session by engaging with the feedback from other group members.    Is this a Weekly Review of the Progress on the Treatment Plan?  no.      Are Treatment Plan Goals being addressed?  Yes, continue treatment goals      Are Treatment Plan Strategies to Address Goals Effective?  Yes, continue treatment strategies      Are there any current contracts in place?  No

## 2019-06-07 NOTE — PROGRESS NOTES
"Adult Mental Health Outpatient Group Therapy Progress Note     Client Initial Individualized Goals for Treatment: \"I just want to address this cycle and break it up and try to find some coping mechanisms that actually work\".    See Initial Treatment suggestions for the client during the time between Diagnostic Assessment and completion of the Master Individualized Treatment Plan.    Treatment Goals:  Follow Safety Plan   Ask for more information, support and/or assistance as needed.  Follow up with providers/community supports as needed: Interested in psychiatry.  Interested in individual therapy.  Report increases or changes in symptoms to staff.  Report any personal safety concerns to staff.   Take medications as prescribed.  Report medication changes and/or side effects to staff.  Attend and participate in groups as scheduled or notify staff if unable to do so.  Report any use of substances to staff as this may impact your symptoms and/or personal safety.  Notify staff if you have any other issues that need to be addressed. This may include any current abuse / neglect / exploitation or other vulnerability.  Follow recommendations of your treatment team and discuss concerns if not in agreement.    Area of Treatment Focus:  Symptom Management    Therapeutic Interventions/Treatment Strategies:  Support, Feedback, Safety Assessments, Structured Activity and Education    Response to Treatment Strategies:  Accepted Feedback, Listened, Attentive, Accepted Support and Alert     Name of Group: Life  Skills(1:00-2:00)      Number of Group Participants: 4    Description and Outcome:  Client presented as calm and alert with good focus and concentration. Psychosocial skills addressed included a discussion related to self awareness/self expression. Daily functioning problems identified by client include managing finances,getting personal tasks accomplished and handling personal responsibilities. Initial treatment goals include " improving self esteem,social support and decrease procrastination. Client agreed also to complete some additional screening for ADHD per academic history reported to therapist.  Client would benefit from additional opportunities to practice and implement content from this session  in every day life,relationships and mental health recovery.    Is this a Weekly Review of the Progress on the Treatment Plan?  Yes.      Are Treatment Plan Goals being addressed?  Yes, continue treatment goals      Are Treatment Plan Strategies to Address Goals Effective?  Yes, continue treatment strategies      Are there any current contracts in place?  No

## 2019-06-10 ENCOUNTER — HOSPITAL ENCOUNTER (OUTPATIENT)
Dept: BEHAVIORAL HEALTH | Facility: CLINIC | Age: 24
End: 2019-06-10
Attending: PSYCHIATRY & NEUROLOGY
Payer: COMMERCIAL

## 2019-06-10 PROCEDURE — H2012 BEHAV HLTH DAY TREAT, PER HR: HCPCS

## 2019-06-10 NOTE — PROGRESS NOTES
"Adult Mental Health Outpatient Group Therapy Progress Note     Client Initial Individualized Goals for Treatment: \"I just want to address this cycle and break it up and try to find some coping mechanisms that actually work\".    See Initial Treatment suggestions for the client during the time between Diagnostic Assessment and completion of the Master Individualized Treatment Plan.    Treatment Goals:  Follow Safety Plan   Ask for more information, support and/or assistance as needed.  Follow up with providers/community supports as needed: Interested in psychiatry.  Interested in individual therapy.  Report increases or changes in symptoms to staff.  Report any personal safety concerns to staff.   Take medications as prescribed.  Report medication changes and/or side effects to staff.  Attend and participate in groups as scheduled or notify staff if unable to do so.  Report any use of substances to staff as this may impact your symptoms and/or personal safety.  Notify staff if you have any other issues that need to be addressed. This may include any current abuse / neglect / exploitation or other vulnerability.  Follow recommendations of your treatment team and discuss concerns if not in agreement.                  Area of Treatment Focus:  Symptom Management and Personal Safety    Therapeutic Interventions/Treatment Strategies:  Support, Safety Assessments and Education    Response to Treatment Strategies:  Accepted Feedback, Gave Feedback, Listened, Focused on Goals, Attentive and Accepted Support     Group:  Group Therapy Date and Time 6/10/2019 [id number] Group total: 3    Description and outcome:  Mental Health Professional checked it with group by asking them: how they are feeling, what goal they are working on, what skill will be used to reach their goal, what is something they are proud of, what might be a barrier to their goal, how the therapist can support them, and if they need additional time to process.   " Psychotherapist encouraged group to support each other by providing feedback as appropriate.   Rodo checked in and reports feeling thoghtful today, he has been feeling more calm and back to his baseline, he reports he is proud of trying to develop coping skills, he processed how his mom visited and they were able to talk about MH which is new.     Therapist validated and normalized emotions.  Pt accepted feedback from others and provided appropriate feedback to others in the group.    Pt did not endorse safety concerns at this time.    Pt reported taking all medications as prescribed.     Pt verbalized understanding of the session by engaging with the feedback from other group members.    Group:  Group Therapy Date and Time:  6/10/2019 [id number] Group total: 3    Mental Health Professional facilitated discussion with group about coping skills and  soothing with the 5 senses  used the handout  making a self soothing kit .  Pt participated in reading and applying the skill to their life and identified how the skills may be used effectively to cope with symptoms of distress.    Patient would benefit from additional opportunities to practice and implement content from this session.    Is this a Weekly Review of the Progress on the Treatment Plan?  no.      Are Treatment Plan Goals being addressed?  Yes, continue treatment goals      Are Treatment Plan Strategies to Address Goals Effective?  Yes, continue treatment strategies      Are there any current contracts in place?  No

## 2019-06-11 ENCOUNTER — HOSPITAL ENCOUNTER (OUTPATIENT)
Dept: BEHAVIORAL HEALTH | Facility: CLINIC | Age: 24
End: 2019-06-11
Attending: PSYCHIATRY & NEUROLOGY
Payer: COMMERCIAL

## 2019-06-11 PROCEDURE — H2012 BEHAV HLTH DAY TREAT, PER HR: HCPCS

## 2019-06-11 NOTE — PROGRESS NOTES
"Adult Mental Health Outpatient Group Therapy Progress Note     Client Initial Individualized Goals for Treatment: \"I just want to address this cycle and break it up and try to find some coping mechanisms that actually work\".    See Initial Treatment suggestions for the client during the time between Diagnostic Assessment and completion of the Master Individualized Treatment Plan.    Treatment Goals:  Follow Safety Plan   Ask for more information, support and/or assistance as needed.  Follow up with providers/community supports as needed: Interested in psychiatry.  Interested in individual therapy.  Report increases or changes in symptoms to staff.  Report any personal safety concerns to staff.   Take medications as prescribed.  Report medication changes and/or side effects to staff.  Attend and participate in groups as scheduled or notify staff if unable to do so.  Report any use of substances to staff as this may impact your symptoms and/or personal safety.  Notify staff if you have any other issues that need to be addressed. This may include any current abuse / neglect / exploitation or other vulnerability.  Follow recommendations of your treatment team and discuss concerns if not in agreement.                  Area of Treatment Focus:  Symptom Management and Personal Safety    Therapeutic Interventions/Treatment Strategies:  Support, Safety Assessments and Education    Response to Treatment Strategies:  Accepted Feedback, Gave Feedback, Listened, Focused on Goals, Attentive and Accepted Support     Group:  Mental Health Management Date and Time:  6/11/2019 2:00pm - 2:50pm Group total: 3    Description and Outcome:     Topic: Radical Acceptance  Psychotherapist provide patient and peers were provided education on Radical Acceptance.  They reviewed a handout titled  Radical Acceptance (RA)  which included when and how to use the Radical Acceptance skill.  Patient and peers discussed potential barriers/challenges to " using this skill as well as potential benefits. (ARS)    Client verbalized understanding of the session by engaging with the feedback from other group members.    Client would benefit from additional opportunities to practice and implement content from this session.    Is this a Weekly Review of the Progress on the Treatment Plan?  yes    Are Treatment Plan Goals being addressed?  Yes, continue treatment goals      Are Treatment Plan Strategies to Address Goals Effective?  Yes, continue treatment strategies      Are there any current contracts in place?  No

## 2019-06-11 NOTE — PROGRESS NOTES
"  Adult Mental Health Outpatient Group Therapy Progress Note     Client Initial Individualized Goals for Treatment: \"I just want to address this cycle and break it up and try to find some coping mechanisms that actually work\".     See Initial Treatment suggestions for the client during the time between Diagnostic Assessment and completion of the Master Individualized Treatment Plan.     Treatment Goals:  Follow Safety Plan   Ask for more information, support and/or assistance as needed.  Follow up with providers/community supports as needed: Interested in psychiatry.  Interested in individual therapy.  Report increases or changes in symptoms to staff.  Report any personal safety concerns to staff.   Take medications as prescribed.  Report medication changes and/or side effects to staff.  Attend and participate in groups as scheduled or notify staff if unable to do so.  Report any use of substances to staff as this may impact your symptoms and/or personal safety.  Notify staff if you have any other issues that need to be addressed. This may include any current abuse / neglect / exploitation or other vulnerability.  Follow recommendations of your treatment team and discuss concerns if not in agreement.       Area of Treatment Focus:  Symptom Management    Therapeutic Interventions/Treatment Strategies:  Support, Feedback, Structured Activity, Clarification and Education    Response to Treatment Strategies:  Accepted Feedback, Gave Feedback, Listened, Focused on Goals, Attentive, Accepted Support and Alert    Name of Group:  Mental health management [id number], 3 participants     Description and Outcome:  The group was given a structured journaling worksheet with the focus on feeling identification, expression and containment.  Information on the purpose and benefits of structured journaling was discussed.  Group members were offered the opportunity to share part, all, or none of their journaling and were encouraged to " comment on process. Rodo verbalized understanding of topic and shared that he found structure helpful.  He chose to work with feeling of curiousity.    Is this a Weekly Review of the Progress on the Treatment Plan?  No

## 2019-06-11 NOTE — PROGRESS NOTES
"Adult Mental Health Outpatient Group Therapy Progress Note     Client Initial Individualized Goals for Treatment: \"I just want to address this cycle and break it up and try to find some coping mechanisms that actually work\".    See Initial Treatment suggestions for the client during the time between Diagnostic Assessment and completion of the Master Individualized Treatment Plan.    Treatment Goals:  Follow Safety Plan   Ask for more information, support and/or assistance as needed.  Follow up with providers/community supports as needed: Interested in psychiatry.  Interested in individual therapy.  Report increases or changes in symptoms to staff.  Report any personal safety concerns to staff.   Take medications as prescribed.  Report medication changes and/or side effects to staff.  Attend and participate in groups as scheduled or notify staff if unable to do so.  Report any use of substances to staff as this may impact your symptoms and/or personal safety.  Notify staff if you have any other issues that need to be addressed. This may include any current abuse / neglect / exploitation or other vulnerability.  Follow recommendations of your treatment team and discuss concerns if not in agreement.                  Area of Treatment Focus:  Symptom Management and Personal Safety    Therapeutic Interventions/Treatment Strategies:  Support, Safety Assessments and Education    Response to Treatment Strategies:  Accepted Feedback, Gave Feedback, Listened, Focused on Goals, Attentive and Accepted Support     Group:  Group Therapy Date and Time 6/11/2019 [id number] Group total: 3    Description and outcome:  Mental Health Professional checked it with group by asking them: how they are feeling, what goal they are working on, what skill will be used to reach their goal, what is something they are proud of, what might be a barrier to their goal, how the therapist can support them, and if they need additional time to process.   " Psychotherapist encouraged group to support each other by providing feedback as appropriate.   Rodo checked in and reports feeling pensive and balanced, he has been having more structure in his life and is focused on doing more painting, his goal is to clean his house before Thursday but is struggling with motivation, he is proud that he has been keeping his goal of getting outside daily.     Therapist validated and normalized emotions.  Pt accepted feedback from others and provided appropriate feedback to others in the group.    Pt did not endorse safety concerns at this time.    Pt reported taking all medications as prescribed.     Pt verbalized understanding of the session by engaging with the feedback from other group members.    Is this a Weekly Review of the Progress on the Treatment Plan?  yes    Are Treatment Plan Goals being addressed?  Yes, continue treatment goals      Are Treatment Plan Strategies to Address Goals Effective?  Yes, continue treatment strategies      Are there any current contracts in place?  No

## 2019-06-13 ENCOUNTER — HOSPITAL ENCOUNTER (OUTPATIENT)
Dept: BEHAVIORAL HEALTH | Facility: CLINIC | Age: 24
End: 2019-06-13
Attending: PSYCHIATRY & NEUROLOGY
Payer: COMMERCIAL

## 2019-06-13 DIAGNOSIS — F31.81 BIPOLAR 2 DISORDER (H): Primary | ICD-10-CM

## 2019-06-13 PROCEDURE — 99203 OFFICE O/P NEW LOW 30 MIN: CPT | Performed by: NURSE PRACTITIONER

## 2019-06-13 PROCEDURE — 99207 ZZC CDG-MDM COMPONENT: MEETS MODERATE - UP CODED: CPT | Performed by: NURSE PRACTITIONER

## 2019-06-13 PROCEDURE — H2012 BEHAV HLTH DAY TREAT, PER HR: HCPCS

## 2019-06-13 RX ORDER — LAMOTRIGINE 25 MG/1
25 TABLET ORAL 2 TIMES DAILY
Qty: 60 TABLET | Refills: 0 | Status: SHIPPED | OUTPATIENT
Start: 2019-06-13 | End: 2019-06-27

## 2019-06-13 RX ORDER — LAMOTRIGINE 25 MG/1
25 TABLET ORAL 2 TIMES DAILY
COMMUNITY
End: 2019-06-13

## 2019-06-13 RX ORDER — LAMOTRIGINE 100 MG/1
100 TABLET ORAL DAILY
COMMUNITY
End: 2019-06-13

## 2019-06-13 RX ORDER — GABAPENTIN 100 MG/1
100-300 CAPSULE ORAL 3 TIMES DAILY PRN
COMMUNITY
End: 2019-06-13

## 2019-06-13 RX ORDER — LAMOTRIGINE 100 MG/1
100 TABLET ORAL DAILY
Qty: 30 TABLET | Refills: 0 | Status: SHIPPED | OUTPATIENT
Start: 2019-06-13 | End: 2019-06-27

## 2019-06-13 RX ORDER — GABAPENTIN 100 MG/1
100-300 CAPSULE ORAL 3 TIMES DAILY PRN
Qty: 90 CAPSULE | Refills: 0 | Status: SHIPPED | OUTPATIENT
Start: 2019-06-13 | End: 2019-07-25

## 2019-06-14 NOTE — PROGRESS NOTES
"Adult Day Treatment Program:  Individualized Treatment Plan     Date of Plan: 19    Name: Sally Sanchez (Cassius\) MRN: [id number]    : 1995    Programs:  Day Treatment (DT)     Clinical Track (if applicable):  5C    DSM5 Diagnosis  296.32 (F33.1) Major Depressive Disorder, Recurrent Episode, Moderate _  301.83 (F60.3) Borderline Personality Disorder     Adult Day Treatment Program Multidisciplinary Team Members: Kt Beasley MD and/or Sosa Jduge NP; ТАТЬЯНА Ferrer, Shari Hernández RN, Marybel Turner, OTR/L,  Bethany Elizalde, [facility], BC-DMT    Sally Sanchez will participate in the Adult Day Treatment Program  3 days per week, 3 hours per day. Anticipated duration/discharge: 12 weeks    Program Start Date: 6/3/19  Anticipated Discharge Date: 19 (pending authorization/clinical changes)    NOTE: Complete CGI     Review Date: Does Sally Sanchez continue to meet criteria to participate in the Adult Day Treatment Program, 3 days per week; 3 hours per day?   19 yes                       Client Strengths:  creative, good listener, support of family, friends and providers and willing to relate to others    Client Participation in Plan:  Contributed to goals and plan   Agrees with plan   Received copy of treatment plan   Discussed with staff     Areas of Vulnerability:  Suicidal Ideation   Depressive symptoms   Self injurious behavior     Long-Term Goals:  Knowledge about illness and management of symptoms   Maintenance of personal safety   Effective management of impulsivity     Abuse Prevention Plan:  Safe, therapeutic environment   Safety coping plan as needed   Education regarding illness and skill development   Coordination with care providers     Discharge Criteria:  Satisfactory progress toward treatment goals   Improvement re: identified problems and symptoms   Ability to continue recovery at next level of service   Has a discharge plan in place   Has safety/coping plan in place   Regular " attendance as scheduled     Areas of Treatment Focus        Area of Treatment Focus:   Personal Safety  Start Date:    6/13/19    Goal:  Target Date: 8/8/19 Status: Completed  Client will notify staff when needing assistance to develop or implement a coping plan to manage suicidal or self injurious urges.  Client will use coping plan for safety, as needed.      Progress:7/30/2019  Rodo is discharging due to a combination of insurance coverage and stepping down to a lower level of care to meet his needs, he plans on attending   DBT, support groups, and continuing with his individual therapist.           Treatment Strategies:   Assist clients in establishing / strengthening support network  Assess / reassess level of potential for harm to self or others  Engage in safety planning when indicated  Facilitate increased self awareness  Teach adaptive coping skills and communication skills        Area of Treatment Focus:   Symptom Stabilization and Management  Start Date:    6/13/19    Goal:  Target Date: 8/8/19 Status: Completed  In Psychotherapy group, Rodo will work on identifying and practicing skills to help with emotion regulation especially when angry and frustrated each week.      Progress:7/30/2019  Rodo is discharging due to a combination of insurance coverage and stepping down to a lower level of care to meet his needs, he plans on attending   DBT, support groups, and continuing with his individual therapist.           Treatment Strategies:   Facilitate increased self awareness  Provide education regarding emotion regulation skills  Teach adaptive coping skills and communication skills  Assist with problem solving        Area of Treatment Focus:   Develop / Improve Independent Living / Socialization Skills  Start Date:    6/13/19    Goal:  Target Date: 8/8/19 Status: Completed  In Life Skills groups, Rodo will work on exploring and clarifying purpose and meaning regarding employment        "Progress:7/30/2019  Rodo is discharging due to a combination of insurance coverage and stepping down to a lower level of care to meet his needs, he plans on attending   DBT, support groups, and continuing with his individual therapist.           Treatment Strategies:   Assist to identify treatment goals  Engage in safety planning when indicated  Facilitate increased self awareness  Provide education regarding values clarification, meaning and purpose   Teach adaptive coping skills and communication skills  Assist with problem solving        Area of Treatment Focus:   Community Resources / Support and Discharge Planning  Start Date:    6/13/19    Goal:  Target Date: 8/8/19 Status: Completed  Will develop an aftercare / transition plan by exploring aftercare resources and employment options by time of discharge       Progress:7/30/2019  Rodo is discharging due to a combination of insurance coverage and stepping down to a lower level of care to meet his needs, he plans on attending   DBT, support groups, and continuing with his individual therapist.           Treatment Strategies:   Assist clients in establishing / strengthening support network  Assist with discharge planning  Facilitate increased self awareness  Teach adaptive coping skills and communication skills       Marybel Turner OTR/L      NOTE: Required signatures are completed manually and scanned into the electronic medical record. See \"Media\" tab in epic.      "

## 2019-06-14 NOTE — PROGRESS NOTES
Acknowledgement of Current Treatment Plan       I have reviewed my treatment plan with my therapist / counselor on 6/13/19.   I agree with the plan as it is written in the electronic health record. (5C)    Name:      Signature:  Sally Beasley MD  Psychiatrist    Sosa Judge, NP    ТАТЬЯНА Ferrer  Psychotherapist    Shari Hernández, RN  Nurse Liaison    Marybel Turner, OTR/L  Occupational Therapist

## 2019-06-14 NOTE — H&P
" DATE OF SERVICE: 6/14/2019                                             ATTENDING PROVIDER: Sosa PABLO CNP  LEGAL STATUS:  Voluntary  SOURCES OF INFORMATION: Information was obtained from the patient and available records.  REASON FOR ASSESSMENT: Continuation of Day Treatment Program  HISTORY F PRESENT ILLNESS: Sally Sanchez is a 23 year old female referred to the Day Treatment Program by his physician assistant, Pretty Bower at [facility].  The reason for referral is management of depression, anger, dissociation, self injury behaviors, passive suicidal ideation, and inability to function.  The patient is a transgender female to male individual who goes by the name of [employer].  He has a history of depression, possibly bipolar disorder, cluster B traits, and cannabis use disorder.  The patient reported the following stressors: Financial difficulties, recent loss of a job due to mental health symptoms, and some difficulties with girlfriend.  The patient's patient is a very good historian.  He is calm pleasant and responding to questions in a timely manner.  He is smiling at times.  The patient reports that the anxiety is currently high.  He is sleeping 7 hours a night and takes naps during the day.  He is very tired all the time.  Has been experiencing severe fatigue for the last 6 months.  Reports that difficulties with memory and concentration.  Appetite is low.  Reports using marijuana 3 times a day to help with appetite.  He has been having issues since high school.  He does not get hungry.  Of note, the patient is very thin.  Reports passive suicidal ideation, no plan or intent to harm himself.  He feels hopeless, helpless, worthless, and irritable.  He has ruminating thoughts.  Anxiety is rated as high.  Feels overwhelmed about life in general.  He worries about various things.  Reports panic attacks 2 times a week with symptoms such as \"emotional paralysis, cannot move, and shut " "down\".  Panic attack lasted 20 minutes to an hour.  When asked about manic symptoms, the patient reports that he has had hypomanic episodes in the past however, was not able to come up with specific symptoms.  Reports that when manic he is very angry and goes into rages.  Reports that he might have had minor episodes of being impulsive, high energy, insomnia, and distractibility.  He has never been grandiose.  Reports one brief psychotic episode when he was 10 years old.  He was hearing voices for about 3 days.  Currently denies any psychotic symptoms including auditory visual hallucinations, paranoia, and delusions.  Denies history of PTSD, OCD, eating disorders, borderline personality disorder, suicide attempts.  Patient reports history of self injury behaviors by hitting and cutting.  The last time he engaged in hitting himself was about a month and a half ago.  The patient reports that he just moved to [place] from [place] last summer.  His psychiatrist is in [place].  In [place], his medications are refilled by his primary care provider.  He has not had medication changes for years.  Reports that the Lamictal is no longer providing the relief it used to.  He was not able to recall exactly how much lithium he is taking.  He is also on testosterone injections (which might be causing the anger outbursts).  Denies seizures, head injuries, and loss of consciousness.    SUBSTANCE USE HISTORY:   The patient denies abusing alcohol, street drugs, or prescription medications.  Reports smoking marijuana 3 times a day to help with his appetite.  States that without it he is not able to eat at all.  Denies history of treatment in detox.  Denies history of withdrawals.    PSYCHIATRIC HISTORY:   The patient has a history of depression, cluster B traits, and cannabis use disorder.  Reports one previous hospitalization for mental illness while living in [place], 4 years ago.  He was sent to the emergency room about 3 " weeks ago from his therapist office but was not admitted.  Past medication trials include Zoloft, Lexapro, Wellbutrin, and gabapentin which he was taking for restless leg.  Denies history of ECT treatment.  Denies history of suicide attempts.  Has a history of self injury behaviors by hitting and cutting himself.  Currently does not have a psychiatrist in [place].  His medications are prescribed by primary care provider.  He has a therapist.  PAST MEDICAL HISTORY:   Past Medical History:   Diagnosis Date     Mood disorder (H)      No past surgical history on file.    ALLERGIES:    Allergies   Allergen Reactions     Sulfa Drugs Rash     FAMILY HISTORY:  Patient does not know if there is a mental illness or chemical dependency in his family.  The patient grew up in [place] [place] [facility] [place].  No family history on file.    SOCIAL HISTORY: He is an only child.  His parents were never .  The patient has never been  and has no children.  Educational history includes some college.  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  Lives with his girlfriend.      ROS: Negative, except as noted in HPI.   There were no vitals taken for this visit.    MENTAL STATUS EXAM: The patient is a very pleasant, tall, slender, , transgender female to male individual who is clean and dress appropriately for the season.  He is calm, pleasant, and cooperative.  Eye contact is good, mood is depressed, affect is mood congruent, speech is clear and coherent, psychomotor behavior is negative for agitation and retardation, thought process is logical and goal oriented, no loose associations, thought content is negative for suicidal homicidal ideation, paranoia, delusions, auditory visual hallucinations, insight and judgment are intact, he is oriented to self, date, situation, attention span and concentration are intact, recent and remote memory are intact, he has no  problems expressing himself, and fund of knowledge is adequate for the level of education and training.    DIAGNOSIS:  1.  Major depressive disorder, recurrent, severe, without psychosis versus Bipolar 2 disorder currently depressed  2.  Restless leg syndrome, per history  3.  Cluster B traits  4.  Cannabis use disorder  5.  Rule out eating disorder  CURRENT MEDICATIONS:   The patient did not know the exact dose of the Lamictal only that he is taking it twice a day.  According to our records, he is currently taking 50 mg twice a day.  He is also on testosterone injections prescribed by his primary care provider.  PLAN AND RECOMMENDATIONS:  1. Continue Day Treatment Program. Patient finds the education and support of the the program helpful in keeping current symptoms under control.  2. Continue current medications.  Currently, patient feels very depressed and would like to have a medication change.  His medications are prescribed by primary care provider.  He has a psychiatrist in [place] that he has not seen for 8 or 9 months.  The patient is interested in medication changes.  We discussed various options including increasing the dose of Lamictal and adding Gabapentin to manage the anger and anxiety.  Another option is to add a small dose of Depakote to the already existing dose of Lamictal. Adding Wellbutrin or any other stimulants would impair his appetite even further, thus he should stay away from them. The patient chose to increase the dose of the Lamictal and add a small dose of gabapentin.      The medication  changes include:  --Increase Lamictal to 75 mg twice a day  --Start gabapentin 100 to 300 mg 3 times a day as needed for anxiety, anger, agitation  --Return to clinic in 2 weeks    3. The patient was encourage to follow up with PCP  4. The patient was advised to let us know if inpatient admission or further help is needed.  5. Care was coordinated with the treatment team.  Attestation: Patient has  been seen and evaluated by beatrice PABLO CNP.  6/13/2019      This note was created with the help of Dragon dictation system. All grammatical/typing errors or context distortion are unintentional and inherent to software.

## 2019-06-17 ENCOUNTER — HOSPITAL ENCOUNTER (OUTPATIENT)
Dept: BEHAVIORAL HEALTH | Facility: CLINIC | Age: 24
End: 2019-06-17
Attending: PSYCHIATRY & NEUROLOGY
Payer: COMMERCIAL

## 2019-06-17 PROCEDURE — H2012 BEHAV HLTH DAY TREAT, PER HR: HCPCS

## 2019-06-17 NOTE — PROGRESS NOTES
"Adult Mental Health Outpatient Group Therapy Progress Note     Client Initial Individualized Goals for Treatment: \"I just want to address this cycle and break it up and try to find some coping mechanisms that actually work\".    See Initial Treatment suggestions for the client during the time between Diagnostic Assessment and completion of the Master Individualized Treatment Plan.    Treatment Goals:  Follow Safety Plan   Ask for more information, support and/or assistance as needed.  Follow up with providers/community supports as needed: Interested in psychiatry.  Interested in individual therapy.  Report increases or changes in symptoms to staff.  Report any personal safety concerns to staff.   Take medications as prescribed.  Report medication changes and/or side effects to staff.  Attend and participate in groups as scheduled or notify staff if unable to do so.  Report any use of substances to staff as this may impact your symptoms and/or personal safety.  Notify staff if you have any other issues that need to be addressed. This may include any current abuse / neglect / exploitation or other vulnerability.  Follow recommendations of your treatment team and discuss concerns if not in agreement.                  Area of Treatment Focus:  Symptom Management and Personal Safety    Therapeutic Interventions/Treatment Strategies:  Support, Safety Assessments and Education    Response to Treatment Strategies:  Accepted Feedback, Gave Feedback, Listened, Focused on Goals, Attentive and Accepted Support     Group:  Group Therapy Date and Time 6/17/2019 [id number] Group total: 4    Description and outcome:  Mental Health Professional checked it with group by asking them: how they are feeling, what goal they are working on, what skill will be used to reach their goal, what is something they are proud of, what might be a barrier to their goal, how the therapist can support them, and if they need additional time to process.   " Psychotherapist encouraged group to support each other by providing feedback as appropriate.   Rodo checked in and reports he is feeling weird and lost today, he is in the middle of changing his RX and reports that he feels not normal, he had a panic attack on Saturday and it shook him up, he has been working on mindfulness.     Therapist validated and normalized emotions.  Pt accepted feedback from others and provided appropriate feedback to others in the group.    Pt did not endorse safety concerns at this time.    Pt reported taking all medications as prescribed.     Pt verbalized understanding of the session by engaging with the feedback from other group members.    Group:  Group Therapy Date and Time:  6/17/2019 [id number] Group total: 5    Mental Health Professional facilitated discussion with group about self compassion and affirmations, pt engaged in activity to practice self compassion towards themselves.  Pt participated in activity discussed the barriers to self compassion.    Patient would benefit from additional opportunities to practice and implement content from this session.    Is this a Weekly Review of the Progress on the Treatment Plan?  yes    Are Treatment Plan Goals being addressed?  Yes, continue treatment goals      Are Treatment Plan Strategies to Address Goals Effective?  Yes, continue treatment strategies      Are there any current contracts in place?  No

## 2019-06-20 ENCOUNTER — HOSPITAL ENCOUNTER (OUTPATIENT)
Dept: BEHAVIORAL HEALTH | Facility: CLINIC | Age: 24
End: 2019-06-20
Attending: PSYCHIATRY & NEUROLOGY
Payer: COMMERCIAL

## 2019-06-20 PROCEDURE — H2012 BEHAV HLTH DAY TREAT, PER HR: HCPCS

## 2019-06-24 ENCOUNTER — HOSPITAL ENCOUNTER (OUTPATIENT)
Dept: BEHAVIORAL HEALTH | Facility: CLINIC | Age: 24
End: 2019-06-24
Attending: PSYCHIATRY & NEUROLOGY
Payer: COMMERCIAL

## 2019-06-24 PROCEDURE — H2012 BEHAV HLTH DAY TREAT, PER HR: HCPCS

## 2019-06-24 NOTE — PROGRESS NOTES
"Adult Mental Health Outpatient Group Therapy Progress Note     Client Initial Individualized Goals for Treatment: \"I just want to address this cycle and break it up and try to find some coping mechanisms that actually work\".    See Initial Treatment suggestions for the client during the time between Diagnostic Assessment and completion of the Master Individualized Treatment Plan.    Treatment Goals:  Follow Safety Plan   Ask for more information, support and/or assistance as needed.  Follow up with providers/community supports as needed: Interested in psychiatry.  Interested in individual therapy.  Report increases or changes in symptoms to staff.  Report any personal safety concerns to staff.   Take medications as prescribed.  Report medication changes and/or side effects to staff.  Attend and participate in groups as scheduled or notify staff if unable to do so.  Report any use of substances to staff as this may impact your symptoms and/or personal safety.  Notify staff if you have any other issues that need to be addressed. This may include any current abuse / neglect / exploitation or other vulnerability.  Follow recommendations of your treatment team and discuss concerns if not in agreement.                  Area of Treatment Focus:  Symptom Management and Personal Safety    Therapeutic Interventions/Treatment Strategies:  Support, Safety Assessments and Education    Response to Treatment Strategies:  Accepted Feedback, Gave Feedback, Listened, Focused on Goals, Attentive and Accepted Support     Group:  Group Therapy Date and Time 6/24/2019 [id number] Group total: 5    Description and outcome:  Mental Health Professional checked it with group by asking them: how they are feeling, what goal they are working on, what skill will be used to reach their goal, what is something they are proud of, what might be a barrier to their goal, how the therapist can support them, and if they need additional time to process.   " Psychotherapist encouraged group to support each other by providing feedback as appropriate.   Rodo checked in and reports feeling energetic today, he reports improved energy due to the medication change, he reports feeling overwhelmed since his GF had another emergency surgery, he reports a goal of journaling this week     Therapist validated and normalized emotions.  Pt accepted feedback from others and provided appropriate feedback to others in the group.    Pt did not endorse safety concerns at this time.    Pt reported taking all medications as prescribed.     Pt verbalized understanding of the session by engaging with the feedback from other group members.    Group:  Group Therapy Date and Time:  6/24/2019 [id number] Group total: 5    Mental Health Professional facilitated discussion with group about behavior change and being able to chain analyze their behaviors and the events leading up to them, used handout  visual behavior change analysis directions .  Pt participated in the discussion and filled out the worksheet and then discussed their findings.    Patient would benefit from additional opportunities to practice and implement content from this session.    Is this a Weekly Review of the Progress on the Treatment Plan?  yes  Are Treatment Plan Goals being addressed?  Yes, continue treatment goals      Are Treatment Plan Strategies to Address Goals Effective?  Yes, continue treatment strategies      Are there any current contracts in place?  No

## 2019-06-25 ENCOUNTER — HOSPITAL ENCOUNTER (OUTPATIENT)
Dept: BEHAVIORAL HEALTH | Facility: CLINIC | Age: 24
End: 2019-06-25
Attending: PSYCHIATRY & NEUROLOGY
Payer: COMMERCIAL

## 2019-06-25 PROCEDURE — H2012 BEHAV HLTH DAY TREAT, PER HR: HCPCS

## 2019-06-25 NOTE — PROGRESS NOTES
"  Adult Mental Health Outpatient Group Therapy Progress Note     Client Initial Individualized Goals for Treatment: \"I just want to address this cycle and break it up and try to find some coping mechanisms that actually work\".     See Initial Treatment suggestions for the client during the time between Diagnostic Assessment and completion of the Master Individualized Treatment Plan.     Treatment Goals:  Follow Safety Plan   Ask for more information, support and/or assistance as needed.  Follow up with providers/community supports as needed: Interested in psychiatry.  Interested in individual therapy.  Report increases or changes in symptoms to staff.  Report any personal safety concerns to staff.   Take medications as prescribed.  Report medication changes and/or side effects to staff.  Attend and participate in groups as scheduled or notify staff if unable to do so.  Report any use of substances to staff as this may impact your symptoms and/or personal safety.  Notify staff if you have any other issues that need to be addressed. This may include any current abuse / neglect / exploitation or other vulnerability.  Follow recommendations of your treatment team and discuss concerns if not in agreement.     Area of Treatment Focus:  Symptom Management    Therapeutic Interventions/Treatment Strategies:  Support, Feedback, Structured Activity and Clarification    Response to Treatment Strategies:  Accepted Feedback, Gave Feedback, Listened, Focused on Goals, Attentive, Accepted Support and Alert    Name of Group:  Mental health management [id number], 4 participants     Description and Outcome:  The topic of movement/body in mindfulness was introduced to the group.  The group discussed the importance of connecting with one's body while identifying barriers, such as body image, trauma history, or sense of self.  The difficulty of moving was validated and the group was encouraged to set limits for themselves.  Gentle stretches " were offered and then play with beach ball for activation and connection.  Group members demonstrated understanding of session through their participation and contribution to discussion.  Rodo shared that it is difficult for him to focus on breathe as it feels overwhelming.  He stated he liked focusing on changing tempo of throwing ball.    Is this a Weekly Review of the Progress on the Treatment Plan?  No

## 2019-06-25 NOTE — PROGRESS NOTES
"Adult Mental Health Outpatient Group Therapy Progress Note     Client Initial Individualized Goals for Treatment: \"I just want to address this cycle and break it up and try to find some coping mechanisms that actually work\".    See Initial Treatment suggestions for the client during the time between Diagnostic Assessment and completion of the Master Individualized Treatment Plan.    Treatment Goals:  Follow Safety Plan   Ask for more information, support and/or assistance as needed.  Follow up with providers/community supports as needed: Interested in psychiatry.  Interested in individual therapy.  Report increases or changes in symptoms to staff.  Report any personal safety concerns to staff.   Take medications as prescribed.  Report medication changes and/or side effects to staff.  Attend and participate in groups as scheduled or notify staff if unable to do so.  Report any use of substances to staff as this may impact your symptoms and/or personal safety.  Notify staff if you have any other issues that need to be addressed. This may include any current abuse / neglect / exploitation or other vulnerability.  Follow recommendations of your treatment team and discuss concerns if not in agreement.                  Area of Treatment Focus:  Symptom Management and Personal Safety    Therapeutic Interventions/Treatment Strategies:  Support, Safety Assessments and Education    Response to Treatment Strategies:  Accepted Feedback, Gave Feedback, Listened, Focused on Goals, Attentive and Accepted Support     Group:  Group Therapy Date and Time 6/25/2019 [id number] Group total: 5    Description and outcome:  Mental Health Professional checked it with group by asking them: how they are feeling, what goal they are working on, what skill will be used to reach their goal, what is something they are proud of, what might be a barrier to their goal, how the therapist can support them, and if they need additional time to process.   " Psychotherapist encouraged group to support each other by providing feedback as appropriate.   Rodo checked in and reports feeling decent today, he reports having a good feeling and that it is kind of nice and weird to be feeling good, he reports a goal of journaling his emotions, he processed how he is struggling with having a lot of thoughts and feelings about his relationship and not knowing what to do with so many feelings     Therapist validated and normalized emotions.  Pt accepted feedback from others and provided appropriate feedback to others in the group.    Pt did not endorse safety concerns at this time.    Pt reported taking all medications as prescribed.     Pt verbalized understanding of the session by engaging with the feedback from other group members.    Is this a Weekly Review of the Progress on the Treatment Plan?  no  Are Treatment Plan Goals being addressed?  Yes, continue treatment goals      Are Treatment Plan Strategies to Address Goals Effective?  Yes, continue treatment strategies      Are there any current contracts in place?  No

## 2019-06-26 NOTE — ADDENDUM NOTE
Encounter addended by: Shari Hernández RN on: 6/26/2019 10:49 AM   Actions taken: Flowsheet accepted

## 2019-06-27 ENCOUNTER — HOSPITAL ENCOUNTER (OUTPATIENT)
Dept: BEHAVIORAL HEALTH | Facility: CLINIC | Age: 24
End: 2019-06-27
Attending: NURSE PRACTITIONER
Payer: COMMERCIAL

## 2019-06-27 ENCOUNTER — HOSPITAL ENCOUNTER (OUTPATIENT)
Dept: BEHAVIORAL HEALTH | Facility: CLINIC | Age: 24
End: 2019-06-27
Attending: PSYCHIATRY & NEUROLOGY
Payer: COMMERCIAL

## 2019-06-27 DIAGNOSIS — F31.9 BIPOLAR 1 DISORDER (H): Primary | ICD-10-CM

## 2019-06-27 PROCEDURE — 99207 ZZC CDG-MDM COMPONENT: MEETS MODERATE - UP CODED: CPT | Performed by: NURSE PRACTITIONER

## 2019-06-27 PROCEDURE — H2012 BEHAV HLTH DAY TREAT, PER HR: HCPCS

## 2019-06-27 PROCEDURE — 99214 OFFICE O/P EST MOD 30 MIN: CPT | Performed by: NURSE PRACTITIONER

## 2019-06-27 RX ORDER — DIVALPROEX SODIUM 250 MG/1
250 TABLET, DELAYED RELEASE ORAL 3 TIMES DAILY
COMMUNITY
End: 2019-06-27

## 2019-06-27 RX ORDER — DIVALPROEX SODIUM 250 MG/1
250 TABLET, DELAYED RELEASE ORAL 3 TIMES DAILY
Qty: 270 TABLET | Refills: 0 | Status: SHIPPED | OUTPATIENT
Start: 2019-06-27 | End: 2019-07-02 | Stop reason: ALTCHOICE

## 2019-06-27 NOTE — PROGRESS NOTES
"[facility]   Psychiatric Progress Note        Interim History:   From H&P: Sally Sanchez is a 23 year old transgender female to male individual who goes by the name of Rodo. He was referred to the Day Treatment Program by his physician assistant, Pretty Bower at [facility].  The reason for referral is management of depression, anger, dissociation, self injury behaviors, passive suicidal ideation, and inability to function.  The patient is a transgender female to male individual who goes by the name of Rodo.  He has a history of depression, possibly bipolar disorder, cluster B traits, and cannabis use disorder.  The patient reported the following stressors: Financial difficulties, recent loss of a job due to mental health symptoms, and some difficulties with girlfriend.  The patient's patient is a very good historian.  He is calm pleasant and responding to questions in a timely manner.  He is smiling at times.  The patient reports that the anxiety is currently high.  He is sleeping 7 hours a night and takes naps during the day.  He is very tired all the time.  Has been experiencing severe fatigue for the last 6 months.  Reports that difficulties with memory and concentration.  Appetite is low.  Reports using marijuana 3 times a day to help with appetite.  He has been having issues since high school.  He does not get hungry.  Of note, the patient is very thin.  Reports passive suicidal ideation, no plan or intent to harm himself.  He feels hopeless, helpless, worthless, and irritable.  He has ruminating thoughts.  Anxiety is rated as high.  Feels overwhelmed about life in general.  He worries about various things.  Reports panic attacks 2 times a week with symptoms such as \"emotional paralysis, cannot move, and shut down\".  Panic attack lasted 20 minutes to an hour.  When asked about manic symptoms, the patient reports that he has had hypomanic episodes in " the past however, was not able to come up with specific symptoms.  Reports that when manic he is very angry and goes into rages.  Reports that he might have had minor episodes of being impulsive, high energy, insomnia, and distractibility.  He has never been grandiose.  Reports one brief psychotic episode when he was 10 years old.  He was hearing voices for about 3 days.  Currently denies any psychotic symptoms including auditory visual hallucinations, paranoia, and delusions.  Denies history of PTSD, OCD, eating disorders, borderline personality disorder, suicide attempts.  Patient reports history of self injury behaviors by hitting and cutting.  The last time he engaged in hitting himself was about a month and a half ago.  The patient reports that he just moved to [place] from [place] last summer.  His psychiatrist is in [place].  In [place], his medications are refilled by his primary care provider.  He has not had medication changes for years.  Reports that the Lamictal is no longer providing the relief it used to.  He was not able to recall exactly how much lithium he is taking.  He is also on testosterone injections (which might be causing the anger outbursts).  Denies seizures, head injuries, and loss of consciousness.    6/27/19: Met with patient.  Reports that he is not feeling any better, in fact he feels that he is doing worse.  He has been feeling more suicidal in the last 2 days.  Denies plan or intention to harm himself.  Patient feels safe to go home.  The patient reports that he has been having difficulty sleeping, reaching 6 hours a night.  Reports increased energy.  He is depressed but also very anxious and angry.  He is not able to relax.  His anger is out of control.  He has ruminating thoughts. Reports that initially when he started gabapentin he felt better however, it worked only for 2 days.  Yesterday, the patient took 300 mg of gabapentin but did not feel that it was helpful.  He  continued to be very angry.  The does not feel that he is currently manic, however, during the initial intake, he reported that when manic he is very angry.  Denies psychotic symptoms.    The patient reports multiple stressors since the last time I saw him including his girlfriend having 3 surgeries this month, financial difficulties, inability to find a job, and relying on friends and family for financial support.    Discussed medication changes.  The patient was agreeable to discontinue Lamictal and start gabapentin 250 mg 3 times a day.  He will have a blood draw in 5 days and come back to see me after that.    Spoke with Juana, the patient's girlfriend with whom he lives the phone number is [phone].  Juana confirmed that in the last 2 days the patient has been more agitated and expressing suicidal ideation.  He had never made a plan.  He had never attempted suicide.  Reports that he has been stressed salt because she needed an emergency surgery last Friday and since then she has needed help with ADLs.  The patient had to cancel several shifts in order to take care of her.  Juana is agreeable to call 911 if the patient continued to decompensate.  States that in the past she had called [facility], and has no problem doing that again.           Medications:   1.  Discontinue Lamictal, the medication is not working  2.  Start Depakote 250 mg 3 times a day  3.  Continue gabapentin 100-300 mg, tid, prn       Allergies:     Allergies   Allergen Reactions     Sulfa Drugs Rash          Labs:     No results found for this or any previous visit (from the past 672 hour(s)).         Psychiatric Examination:                      Weight is 0 lbs 0 oz  There is no height or weight on file to calculate BMI.    Appearance: well groomed, awake, alert, cooperative, severe distress and thin  Attitude:  cooperative  Eye Contact:  intense  Mood:  Depressed, anxious, angry  Affect:  mood congruent and intensity is heightened  Speech:   clear, coherent  Psychomotor Behavior:  no evidence of tardive dyskinesia, dystonia, or tics  Throught Process:  logical and goal oriented  Associations:  no loose associations  Thought Content:  passive SI, no plan or intend.   Insight:  fair  Judgement:  fair  Oriented to:  time, person, and place  Attention Span and Concentration:  fair  Recent and Remote Memory:  fair         Precautions:           DIagnoses:   1.  Major depressive disorder, recurrent, severe, without psychosis versus Bipolar 2 disorder currently depressed  2.  Restless leg syndrome, per history  3.  Cluster B traits  4.  Cannabis use disorder  5.  Rule out eating disorder         Plan:   1. Continue Day Treatment Program. Patient finds the education and support of the the program helpful in keeping current symptoms under control.  2. Med changes:   --Discontinue Lamictal.   --Start Depakote 250 tid.   --Continue Gabapentin 100-300 mg, tid prn.   --Blood work in 5 days, Tuesday, July 2.  --Retern to clinic after the blood draw.   --The Pharmacy was notified about the changes.   3. The patient was encourage to follow up with PCP  4. The patient was advised to let us know if inpatient admission or further help is needed.  5. Care was coordinated with the treatment team.    Sosa PABLO CNP  Date: 06/27/19  Time: 2:40 PM

## 2019-06-27 NOTE — PROGRESS NOTES
"Adult Mental Health Outpatient Group Therapy Progress Note     Client Initial Individualized Goals for Treatment: \"I just want to address this cycle and break it up and try to find some coping mechanisms that actually work\".    See Initial Treatment suggestions for the client during the time between Diagnostic Assessment and completion of the Master Individualized Treatment Plan.    Treatment Goals:  Follow Safety Plan   Ask for more information, support and/or assistance as needed.  Follow up with providers/community supports as needed: Interested in psychiatry.  Interested in individual therapy.  Report increases or changes in symptoms to staff.  Report any personal safety concerns to staff.   Take medications as prescribed.  Report medication changes and/or side effects to staff.  Attend and participate in groups as scheduled or notify staff if unable to do so.  Report any use of substances to staff as this may impact your symptoms and/or personal safety.  Notify staff if you have any other issues that need to be addressed. This may include any current abuse / neglect / exploitation or other vulnerability.  Follow recommendations of your treatment team and discuss concerns if not in agreement.                  Area of Treatment Focus:  Symptom Management and Personal Safety    Therapeutic Interventions/Treatment Strategies:  Support, Safety Assessments and Education    Response to Treatment Strategies:  Accepted Feedback, Gave Feedback, Listened, Focused on Goals, Attentive and Accepted Support     Group:  Group Therapy Date and Time 6/27/2019 [id number] Group total: 4    Description and outcome:  Mental Health Professional checked it with group by asking them: how they are feeling, what goal they are working on, what skill will be used to reach their goal, what is something they are proud of, what might be a barrier to their goal, how the therapist can support them, and if they need additional time to process.   " Psychotherapist encouraged group to support each other by providing feedback as appropriate.   Marie checked in and reports feeling shitty today, he is feeling like he takes care of everything, is taking care of his girlfriend, his house is a mess, and none of his coping skills are working.  After checking in marie asked to leave the group to go home.  He reports that his medications were changed again.     Therapist validated and normalized emotions.  Pt accepted feedback from this writer.    Pt did not endorse safety concerns at this time, he reports active thoughts with no plan or intent.  He reports he is able to follow his safety plan.    Pt reported taking all medications as prescribed.     Pt verbalized understanding of the session by engaging with the feedback from other group members.    Is this a Weekly Review of the Progress on the Treatment Plan?  no  Are Treatment Plan Goals being addressed?  Yes, continue treatment goals      Are Treatment Plan Strategies to Address Goals Effective?  Yes, continue treatment strategies      Are there any current contracts in place?  No

## 2019-07-01 ENCOUNTER — HOSPITAL ENCOUNTER (OUTPATIENT)
Dept: BEHAVIORAL HEALTH | Facility: CLINIC | Age: 24
End: 2019-07-01
Attending: PSYCHIATRY & NEUROLOGY
Payer: COMMERCIAL

## 2019-07-01 PROCEDURE — H2012 BEHAV HLTH DAY TREAT, PER HR: HCPCS

## 2019-07-01 NOTE — TELEPHONE ENCOUNTER
----- Message from ТАТЬЯНА Vyas sent at 7/1/2019  8:04 AM CDT -----  Regarding: covered days  Rodo was approved for 12 days in day treatment and has not used all of his days, he has used 10 days due to illness.  Please add 2 more days to the DENITA for today 7/1/2019 and tomorrow 7/2/2019.  Thank you.  The authorized dates go until august.

## 2019-07-01 NOTE — PROGRESS NOTES
"Adult Mental Health Outpatient Group Therapy Progress Note     Client Initial Individualized Goals for Treatment: \"I just want to address this cycle and break it up and try to find some coping mechanisms that actually work\".    See Initial Treatment suggestions for the client during the time between Diagnostic Assessment and completion of the Master Individualized Treatment Plan.    Treatment Goals:  Follow Safety Plan   Ask for more information, support and/or assistance as needed.  Follow up with providers/community supports as needed: Interested in psychiatry.  Interested in individual therapy.  Report increases or changes in symptoms to staff.  Report any personal safety concerns to staff.   Take medications as prescribed.  Report medication changes and/or side effects to staff.  Attend and participate in groups as scheduled or notify staff if unable to do so.  Report any use of substances to staff as this may impact your symptoms and/or personal safety.  Notify staff if you have any other issues that need to be addressed. This may include any current abuse / neglect / exploitation or other vulnerability.  Follow recommendations of your treatment team and discuss concerns if not in agreement.                  Area of Treatment Focus:  Symptom Management and Personal Safety    Therapeutic Interventions/Treatment Strategies:  Support, Safety Assessments and Education    Response to Treatment Strategies:  Accepted Feedback, Gave Feedback, Listened, Focused on Goals, Attentive and Accepted Support     Group:  Group Therapy Date and Time 7/1/2019 [id number] Group total: 7  Description and outcome:  Mental Health Professional checked it with group by asking them: how they are feeling, what goal they are working on, what skill will be used to reach their goal, what is something they are proud of, what might be a barrier to their goal, how the therapist can support them, and if they need additional time to process.   " Psychotherapist encouraged group to support each other by providing feedback as appropriate.   Rodo checked in and reports feeling anxious but that it is manageable, and has a goal of creating a morning routine, he reports trying out different skills to see what will work    Therapist validated and normalized emotions.  Pt accepted feedback from others and provided appropriate feedback to others in the group.  Pt did not endorse safety concerns at this time.  Pt reported taking all medications as prescribed.  Pt verbalized understanding of the session by engaging with the feedback from other group members.    Group:  Group Therapy Date and Time:  7/1/2019 [id number] Group total: 7  Mental Health Professional facilitated discussion with group about trauma and how people respond to trauma, did the hand out on self care awareness and evaluation.  Pt participated in the discussion and filled out the worksheet and then discussed their findings.  Patient would benefit from additional opportunities to practice and implement content from this session.    Is this a Weekly Review of the Progress on the Treatment Plan?  yes  Are Treatment Plan Goals being addressed?  Yes, continue treatment goals      Are Treatment Plan Strategies to Address Goals Effective?  Yes, continue treatment strategies      Are there any current contracts in place?  No

## 2019-07-02 ENCOUNTER — HOSPITAL ENCOUNTER (OUTPATIENT)
Dept: BEHAVIORAL HEALTH | Facility: CLINIC | Age: 24
End: 2019-07-02
Attending: PSYCHIATRY & NEUROLOGY
Payer: COMMERCIAL

## 2019-07-02 PROCEDURE — H2012 BEHAV HLTH DAY TREAT, PER HR: HCPCS

## 2019-07-02 NOTE — PROGRESS NOTES
"Adult Mental Health Outpatient Group Therapy Progress Note     Client Initial Individualized Goals for Treatment: \"I just want to address this cycle and break it up and try to find some coping mechanisms that actually work\".    See Initial Treatment suggestions for the client during the time between Diagnostic Assessment and completion of the Master Individualized Treatment Plan.    Treatment Goals:  Will develop an aftercare / transition plan by exploring aftercare resources and employment options by time of discharge    In Life Skills groups, Rodo will work on exploring and clarifying purpose and meaning regarding employment    In Psychotherapy group, Rodo will work on identifying and practicing skills to help with emotion regulation especially when angry and frustrated each week.   Client will notify staff when needing assistance to develop or implement a coping plan to manage suicidal or self injurious urges.  Client will use coping plan for safety, as needed.                 Area of Treatment Focus:  Symptom Management and Personal Safety    Therapeutic Interventions/Treatment Strategies:  Support, Safety Assessments and Education    Response to Treatment Strategies:  Accepted Feedback, Gave Feedback, Listened, Focused on Goals, Attentive and Accepted Support     Group:  Group Therapy Date and Time 7/2/2019 [id number] Group total: 5    Description and outcome:  Mental Health Professional checked it with group by asking them: how they are feeling, what goal they are working on, what skill will be used to reach their goal, what is something they are proud of, what might be a barrier to their goal, how the therapist can support them, and if they need additional time to process.   Psychotherapist encouraged group to support each other by providing feedback as appropriate.   Rodo checked in and reports feeling anxious and pensive today, he is working on reducing the amount of time spent ruminating and " setting up a morning routine.  He is feeling worried about today being his last authorized day for insurance.     Therapist validated and normalized emotions.  Pt accepted feedback from others and provided appropriate feedback to others in the group.    Pt did not endorse safety concerns at this time.    Pt reported taking all medications as prescribed.     Pt verbalized understanding of the session by engaging with the feedback from other group members.    Group:  Group Therapy Date and Time:  7/2/2019 [id number] Group total: 5    Mental Health Professional facilitated discussion with group about boundaries and used the hand out  what are personal boundaries  discussed what boundaries look like in different situations and how to set them.  Pt participated in the discussion and filled out the worksheet and then discussed their findings.    Patient would benefit from additional opportunities to practice and implement content from this session.    Is this a Weekly Review of the Progress on the Treatment Plan?  no  Are Treatment Plan Goals being addressed?  Yes, continue treatment goals      Are Treatment Plan Strategies to Address Goals Effective?  Yes, continue treatment strategies      Are there any current contracts in place?  No

## 2019-07-03 NOTE — PROGRESS NOTES
Psychiatry staffing: case discussed  Diagnosis: Bipolar II, Borderline PD. Was suicidal last week. Better this week.    Current Outpatient Medications   Medication     ferrous sulfate (IRON) 325 (65 Fe) MG tablet     gabapentin (NEURONTIN) 100 MG capsule     lamoTRIgine (LAMICTAL) 25 MG tablet     testosterone cypionate (DEPOTESTOSTERONE) 200 MG/ML injection     testosterone cypionate (DEPOTESTOSTERONE) 200 MG/ML injection     No current facility-administered medications for this encounter.      Past Medical History:   Diagnosis Date     Mood disorder (H)

## 2019-07-05 NOTE — PROGRESS NOTES
"Adult Mental Health Outpatient Group Therapy Progress Note     Client Initial Individualized Goals for Treatment:  \"I just want to address this cycle and break it up and try to find some coping mechanisms that actually work\".     Treatment Goals:  1.  Client will notify staff when needing assistance to develop or implement a coping plan to manage suicidal or self injurious urges.  Client will use coping plan for safety, as needed.  2.  In Psychotherapy group, Rodo will work on identifying and practicing skills to help with emotion regulation especially when angry and frustrated each week.   3.  In Life Skills groups, Rodo will work on exploring and clarifying purpose and meaning regarding employment    4.  Will develop an aftercare / transition plan by exploring aftercare resources and employment options by time of discharge      Area of Treatment Focus:  Symptom Management, Personal Safety, Community Resources/Discharge Planning and Develop / Improve Independent Living Skills    Therapeutic Interventions/Treatment Strategies:  Support, Feedback, Safety Assessments, Structured Activity, Clarification, Education and community resources    Response to Treatment Strategies:  Accepted Feedback, Gave Feedback, Listened, Accepted Support, Alert and Distracted     Name of Group: Life Skills Date/Time: 7/2/19  [id number]     Number of Group Participants: 5     Description and Outcome:  Provided verbal and experiential psychoeducation on how engagement in mindful based focused activity can aid in symptom management and self regulation.  Rodo demonstrated understanding of session content by practicing mindfulness through focused task work.  Reviewed resources in the community in case today is his last day in the program due to insurance coverage.  Provided him with a written list of DBT groups, [facility] support groups, [employer], and community support programs.  Client would benefit from additional opportunities to " practice and implement content from this session.    Is this a Weekly Review of the Progress on the Treatment Plan?  Yes.      Are Treatment Plan Goals being addressed?  Yes, continue treatment goals      Are Treatment Plan Strategies to Address Goals Effective?  Yes, continue treatment strategies      Are there any current contracts in place?  No

## 2019-07-08 ENCOUNTER — HOSPITAL ENCOUNTER (OUTPATIENT)
Dept: BEHAVIORAL HEALTH | Facility: CLINIC | Age: 24
End: 2019-07-08
Attending: PSYCHIATRY & NEUROLOGY
Payer: COMMERCIAL

## 2019-07-08 PROCEDURE — H2012 BEHAV HLTH DAY TREAT, PER HR: HCPCS

## 2019-07-08 NOTE — PROGRESS NOTES
"Adult Mental Health Outpatient Group Therapy Progress Note     Client Initial Individualized Goals for Treatment: \"I just want to address this cycle and break it up and try to find some coping mechanisms that actually work\".    See Initial Treatment suggestions for the client during the time between Diagnostic Assessment and completion of the Master Individualized Treatment Plan.    Treatment Goals:  Will develop an aftercare / transition plan by exploring aftercare resources and employment options by time of discharge    In Life Skills groups, Rodo will work on exploring and clarifying purpose and meaning regarding employment    In Psychotherapy group, Rodo will work on identifying and practicing skills to help with emotion regulation especially when angry and frustrated each week.   Client will notify staff when needing assistance to develop or implement a coping plan to manage suicidal or self injurious urges.  Client will use coping plan for safety, as needed.                 Area of Treatment Focus:  Symptom Management and Personal Safety    Therapeutic Interventions/Treatment Strategies:  Support, Safety Assessments and Education    Response to Treatment Strategies:  Accepted Feedback, Gave Feedback, Listened, Focused on Goals, Attentive and Accepted Support     Group:  Group Therapy Date and Time 7/8/2019 [id number] Group total: 4    Description and outcome:  Mental Health Professional checked it with group by asking them: how they are feeling, what goal they are working on, what skill will be used to reach their goal, what is something they are proud of, what might be a barrier to their goal, how the therapist can support them, and if they need additional time to process.   Psychotherapist encouraged group to support each other by providing feedback as appropriate.   Rodo checked in and reports feeling pensive today, he has a goal of setting up appts and getting errands done, he is going to use the " skill of writing stuff down at home to remember.  He is proud that he is more mindful, he processed how he has been struggling to know what he wants to do for work.     Therapist validated and normalized emotions.  Pt accepted feedback from others and provided appropriate feedback to others in the group.    Pt did not endorse safety concerns at this time.    Pt reported taking all medications as prescribed.     Pt verbalized understanding of the session by engaging with the feedback from other group members.    Group:  Group Therapy Date and Time:  7/8/2019 [id number] Group total: 4    Mental Health Professional facilitated discussion with group about radical acceptance and used the hand out  radical acceptance core concept  discussed what radical acceptance look like in different situations and how it may change their outlook.  Pt participated in the discussion and filled out the worksheet and then discussed their findings.    Patient would benefit from additional opportunities to practice and implement content from this session.    Is this a Weekly Review of the Progress on the Treatment Plan?  yes  Are Treatment Plan Goals being addressed?  Yes, continue treatment goals      Are Treatment Plan Strategies to Address Goals Effective?  Yes, continue treatment strategies      Are there any current contracts in place?  No

## 2019-07-09 ENCOUNTER — HOSPITAL ENCOUNTER (OUTPATIENT)
Dept: BEHAVIORAL HEALTH | Facility: CLINIC | Age: 24
End: 2019-07-09
Attending: PSYCHIATRY & NEUROLOGY
Payer: COMMERCIAL

## 2019-07-09 PROCEDURE — H2012 BEHAV HLTH DAY TREAT, PER HR: HCPCS

## 2019-07-09 NOTE — PROGRESS NOTES
"Adult Mental Health Outpatient Group Therapy Progress Note     Client Initial Individualized Goals for Treatment: \"I just want to address this cycle and break it up and try to find some coping mechanisms that actually work\".    See Initial Treatment suggestions for the client during the time between Diagnostic Assessment and completion of the Master Individualized Treatment Plan.    Treatment Goals:  Will develop an aftercare / transition plan by exploring aftercare resources and employment options by time of discharge    In Life Skills groups, Rodo will work on exploring and clarifying purpose and meaning regarding employment    In Psychotherapy group, Rodo will work on identifying and practicing skills to help with emotion regulation especially when angry and frustrated each week.   Client will notify staff when needing assistance to develop or implement a coping plan to manage suicidal or self injurious urges.  Client will use coping plan for safety, as needed.                 Area of Treatment Focus:  Symptom Management and Personal Safety    Therapeutic Interventions/Treatment Strategies:  Support, Safety Assessments and Education    Response to Treatment Strategies:  Accepted Feedback, Gave Feedback, Listened, Focused on Goals, Attentive and Accepted Support     Group:  Group Therapy Date and Time:  7/9/2019 9311-2291 Group total: 5    Mental Health Professional facilitated discussion with group about anger and used the hand out  when is anger a problem  discussed when anger is useful and helpful ways to manage anger.  Pt participated in the discussion and filled out the worksheet and then discussed their findings.    Patient would benefit from additional opportunities to practice and implement content from this session.    Is this a Weekly Review of the Progress on the Treatment Plan?  no  Are Treatment Plan Goals being addressed?  Yes, continue treatment goals      Are Treatment Plan Strategies to " Address Goals Effective?  Yes, continue treatment strategies      Are there any current contracts in place?  No

## 2019-07-15 ENCOUNTER — HOSPITAL ENCOUNTER (OUTPATIENT)
Dept: BEHAVIORAL HEALTH | Facility: CLINIC | Age: 24
End: 2019-07-15
Attending: PSYCHIATRY & NEUROLOGY
Payer: COMMERCIAL

## 2019-07-15 PROCEDURE — 90853 GROUP PSYCHOTHERAPY: CPT

## 2019-07-15 PROCEDURE — G0177 OPPS/PHP; TRAIN & EDUC SERV: HCPCS

## 2019-07-15 NOTE — PROGRESS NOTES
Adult Mental Health Day Treatment  TRACK: 5C    PATIENT'S NAME: Sally Sanchez  MRN:   [id number]  :   1995  ACCT. NUMBER: [id number]  DATE OF SERVICE: 7/15/19  START TIME: 3:00 pm  END TIME: 3:50 pm  NUMBER OF PARTICIPANTS: 4      Summary of Group / Topics Discussed:  Self-Awareness: Self-Compassion: Patients received overview of key concepts in developing self-compassion. Patients discussed mindfulness, self-kindness, and finding common humanity. Patients identified their current approach to problems in their lives and learned skills for increasing self-compassion. Patients identified ways they can put self-compassion skills into practice and problem solve barriers to application of skills.     Patient Session Goals / Objectives:    [place]mer components of self-compassion    Identify ways to practice self-compassion in daily life    Problem solve barriers to self-compassion practice    Patient Participation / Response:  Fully participated with the group by sharing personal reflections / insights and openly received / provided feedback with other participants.    Demonstrated understanding of topics discussed through group discussion and participation, Demonstrated understanding of values, strengths, and challenges to learn about themselves, Identified / Expressed readiness to act intentionally, increase self-compassion, promote personal growth, Verbalized understanding of ways to proactively manage illness, Identified plan to address barriers to practicing skills that promote self-awareness  and Practiced skills in session    Treatment Plan:  Patient has a current master individualized treatment plan.  See Epic treatment plan for more information.        Odessa Beasley

## 2019-07-15 NOTE — PROGRESS NOTES
"[facility] Outpatient Group Therapy Progress Note     Client Initial Individualized Goals for Treatment: \"I just want to address this cycle and break it up and try to find some coping mechanisms that actually work\".    See Initial Treatment suggestions for the client during the time between Diagnostic Assessment and completion of the Master Individualized Treatment Plan.    Treatment Goals:  Will develop an aftercare / transition plan by exploring aftercare resources and employment options by time of discharge    In Life Skills groups, Rodo will work on exploring and clarifying purpose and meaning regarding employment    In Psychotherapy group, Rodo will work on identifying and practicing skills to help with emotion regulation especially when angry and frustrated each week.   Client will notify staff when needing assistance to develop or implement a coping plan to manage suicidal or self injurious urges.  Client will use coping plan for safety, as needed.                 Area of Treatment Focus:  Symptom Management and Personal Safety    Therapeutic Interventions/Treatment Strategies:  Support, Safety Assessments and Education    Response to Treatment Strategies:  Accepted Feedback, Gave Feedback, Listened, Focused on Goals, Attentive and Accepted Support     Group:  Psychotherapy  Date and Time:  7/15/2019 1:00-1:50 pm  Group total: 4    Description and outcome:  Rodo reported he is feeling steady and content.  He reported passive suicidal ideation with no intent to act. Engaged in safety planning.  He committed to safety until returning to programming and will follow his safety plan. He reported taking all of his medications and that he \"drank a little\" over the weekend.  He reported his current goal is to maintain positive mood. Provided skill suggestions to aid in meeting this goal.  He reported entering an art competition that will take place next week in [place] and is looking forward to " it.  Encouraged mindfulness to help maintain mood through focusing on positive emotions attached to upcoming competition.  He appeared receptive to feedback.         Pt verbalized understanding of the session by stating plans to practice mindfulness of positive emotions.    Is this a Weekly Review of the Progress on the Treatment Plan?  no    Are Treatment Plan Goals being addressed?  Yes, continue treatment goals      Are Treatment Plan Strategies to Address Goals Effective?  Yes, continue treatment strategies      Are there any current contracts in place?  No         yes

## 2019-07-16 ENCOUNTER — HOSPITAL ENCOUNTER (OUTPATIENT)
Dept: BEHAVIORAL HEALTH | Facility: CLINIC | Age: 24
End: 2019-07-16
Attending: PSYCHIATRY & NEUROLOGY
Payer: COMMERCIAL

## 2019-07-16 PROCEDURE — G0177 OPPS/PHP; TRAIN & EDUC SERV: HCPCS

## 2019-07-16 PROCEDURE — 90853 GROUP PSYCHOTHERAPY: CPT

## 2019-07-16 NOTE — ADDENDUM NOTE
Encounter addended by: Marybel Turner OTR/L on: 7/16/2019 1:47 PM   Actions taken: Flowsheet accepted, Sign clinical note

## 2019-07-16 NOTE — PROGRESS NOTES
Adult Mental Health Day Treatment  TRACK: 5C    PATIENT'S NAME: Sally Sanchez  MRN:   [id number]  :   1995  ACCT. NUMBER: [id number]  DATE OF SERVICE: 19  START TIME: 1400  END TIME: 1450  NUMBER OF PARTICIPANTS: 5      Summary of Group / Topics Discussed:  Resiliency Development: Values identification and Coping Skills: Patient was taught how to identify values, stressors, signs of stress, coping skills, and prevention strategies for overall stress management.  Patient was given the opportunity to identify both ongoing and acute mental health symptoms and how to effectively manage these symptoms.  Patients increased awareness of community based resources.    Patient Session Goals / Objectives:    Identified how using coping skills can be used for symptom and stress management       Improved awareness of individualed symptoms and stressors and how to effectively cope     Established a relapse prevention plan to practice these skills in their own environments    Practiced and reflected on how to generalize taught skills to their everyday life    Patient Participation / Response:  Moderately participated, sharing some personal reflections / insights and adequately adequately received / provided feedback with other participants.    Verbalized understanding of content and Patient would benefit from additional opportunities to practice the content to be able to generalize it to their everyday life with increased intentionality, consistency, and efficacy in support of their psychiatric recovery      Treatment Plan:  Patient has a current master individualized treatment plan.  See Epic treatment plan for more information.          LAVINIA Marin/BASIL

## 2019-07-16 NOTE — PROGRESS NOTES
"Adult Mental Health Outpatient Group Therapy Progress Note     Client Initial Individualized Goals for Treatment: \"I just want to address this cycle and break it up and try to find some coping mechanisms that actually work\".    See Initial Treatment suggestions for the client during the time between Diagnostic Assessment and completion of the Master Individualized Treatment Plan.    Treatment Goals:  Follow Safety Plan   Ask for more information, support and/or assistance as needed.  Follow up with providers/community supports as needed: Interested in psychiatry.  Interested in individual therapy.  Report increases or changes in symptoms to staff.  Report any personal safety concerns to staff.   Take medications as prescribed.  Report medication changes and/or side effects to staff.  Attend and participate in groups as scheduled or notify staff if unable to do so.  Report any use of substances to staff as this may impact your symptoms and/or personal safety.  Notify staff if you have any other issues that need to be addressed. This may include any current abuse / neglect / exploitation or other vulnerability.  Follow recommendations of your treatment team and discuss concerns if not in agreement.                  Area of Treatment Focus:  Symptom Management and Personal Safety    Therapeutic Interventions/Treatment Strategies:  Support, Safety Assessments and Education    Response to Treatment Strategies:  Accepted Feedback, Gave Feedback, Listened, Focused on Goals, Attentive and Accepted Support     Group:  Group Therapy Date and Time 7/16/19, 100-150 Group total: 4    Description and outcome:  Mental Health Professional checked it with group by asking them: how they are feeling, what goal they are working on, what skill will be used to reach their goal, what is something they are proud of, what might be a barrier to their goal, how the therapist can support them, and if they need additional time to process.   " Psychotherapist encouraged group to support each other by providing feedback as appropriate.   Rodo states he has been thinking about relationship with girlfriend.  He states she is in vulnerable place but he can not stop wondering if he wants to open relationship.  Rodo accepted feedback from peers and acknowledges that he is perhaps overthinking.  Accepted encouragement to take time to listen to self to better understand what he is wanting in relationship.   Therapist validated and normalized emotions.  Pt accepted feedback from this writer.    Pt did not endorse safety concerns at this time, he reports active thoughts with no plan or intent.  He reports he is able to follow his safety plan.    Pt reported taking all medications as prescribed.     Pt verbalized understanding of the session by engaging with the feedback from other group members.    Is this a Weekly Review of the Progress on the Treatment Plan?  no  Are Treatment Plan Goals being addressed?  Yes, continue treatment goals      Are Treatment Plan Strategies to Address Goals Effective?  Yes, continue treatment strategies      Are there any current contracts in place?  No

## 2019-07-16 NOTE — PROGRESS NOTES
Past Medical History:   Diagnosis Date     Mood disorder (H)        Current Outpatient Medications:      ferrous sulfate (IRON) 325 (65 Fe) MG tablet, Take 1 tablet (325 mg) by mouth daily (with breakfast) (Patient not taking: Reported on 5/15/2019), Disp: 90 tablet, Rfl: 2     gabapentin (NEURONTIN) 100 MG capsule, Take 1-3 capsules (100-300 mg) by mouth 3 times daily as needed (anxiety, anger), Disp: 90 capsule, Rfl: 0     lamoTRIgine (LAMICTAL) 25 MG tablet, Take 75 mg by mouth 2 times daily, Disp: , Rfl:      testosterone cypionate (DEPOTESTOSTERONE) 200 MG/ML injection, INJECT 0.4MG BY INTRAMUSCULAR ROUTE EVERY WEEK, Disp: , Rfl: 2     testosterone cypionate (DEPOTESTOSTERONE) 200 MG/ML injection, INJECT 0.4MG BY INTRAMUSCULAR ROUTE EVERY WEEK, Disp: , Rfl: 2    Psychiatry staffing: case discussed  Diagnosis:  MDD, BPD, possible bipolar 2.  Active in group, stressors exist.

## 2019-07-18 ENCOUNTER — HOSPITAL ENCOUNTER (OUTPATIENT)
Dept: BEHAVIORAL HEALTH | Facility: CLINIC | Age: 24
End: 2019-07-18
Attending: PSYCHIATRY & NEUROLOGY
Payer: COMMERCIAL

## 2019-07-18 PROCEDURE — 90853 GROUP PSYCHOTHERAPY: CPT

## 2019-07-18 PROCEDURE — G0177 OPPS/PHP; TRAIN & EDUC SERV: HCPCS

## 2019-07-19 NOTE — PROGRESS NOTES
Adult Mental Health Day Treatment  TRACK: 5C    PATIENT'S NAME: Sally Sanchez  MRN:   [id number]  :   1995  ACCT. NUMBER: [id number]  DATE OF SERVICE: 19  START TIME: 1300  END TIME: 1350  NUMBER OF PARTICIPANTS: 4      Summary of Group / Topics Discussed:  Lifestyle Balance and Structure: Routines, Habits, Rituals, and Roles: Patients were assisted to identify meaningful roles that they want to promote and the impact their mental health symptoms have on this.  Patients learned, applied, and generalized skills needed to live and participate in meaningful roles as effectively and independently as possible.  Patients developed awareness of strengths and challenges in fulfilling these roles and worked on integrating specific and individualized rituals and habits into their daily life.     Patient Session Goals / Objectives:    Improved awareness and engagement in life s meaningful roles, routines, habits, and rituals    Explored and identified current roles and challenges due to mental health symptoms     Identified ways to establish and integrate daily self care and wellness routines and habits to support mental health recovery    Practiced and reflected on how to generalize taught skills to their everyday life    Patient Participation / Response:  Fully participated with the group by sharing personal reflections / insights and openly received / provided feedback with other participants.    Patient presentation: adequate energy and concentration, demonstrated healthy risk taking in trying new activities and roles in session, Demonstrated understanding of content through taking healthy risks and trying new roles/occupations  and Patient would benefit from additional opportunities to practice the content to be able to generalize it to their everyday life with increased intentionality, consistency, and efficacy in support of their psychiatric recovery    Treatment Plan:  Patient has a current master  individualized treatment plan.  See Epic treatment plan for more information.  Able to identify some meaningful roles and occupations he is engaged in currently.           Marybel Turner, OTR/L

## 2019-07-19 NOTE — PROGRESS NOTES
Adult Mental Health Day Treatment  TRACK: 5C    PATIENT'S NAME: Sally Sanchez  MRN:   [id number]  :   1995  ACCT. NUMBER: [id number]  DATE OF SERVICE: 19  START TIME: 1400  END TIME: 1450  NUMBER OF PARTICIPANTS: 4      Summary of Group / Topics Discussed:  Sensory Approaches in Mental Health: Focused Activity: Patients were provided with verbal and experiential education to identify physical and sensorimotor based activities that can be utilized for stress management, self care, health maintenance, and self regulation.  Patients increased knowledge and awareness of activities that support good self care, build resiliency, and prevent relapse through healthy engagement in mindful focused activities for effective coping with illness and reduction of maladaptive coping skills.     Patient Session Goals / Objectives:    Identified specific physical and sensorimotor based activities for stress management, self care, health maintenance, and self regulation      Improved awareness of activities that are meaningful focused activities that support good self care, build resiliency, and prevent relapse and how this applies to current daily life    Established a plan for practice of these skills in their own environments    Practiced and reflected on how to generalize taught skills to their everyday life    Patient Participation / Response:  Moderately participated, sharing some personal reflections / insights and adequately adequately received / provided feedback with other participants.    Patient presentation: fair energy and concentration, contributed to discussion at times, Demonstrated understanding of content through practicing mindfulness through focused activity  and Patient would benefit from additional opportunities to practice the content to be able to generalize it to their everyday life with increased intentionality, consistency, and efficacy in support of their psychiatric recovery    Treatment  Plan:  Patient has a current master individualized treatment plan.  See Epic treatment plan for more information.          Marybel Turner, OTR/L

## 2019-07-19 NOTE — PROGRESS NOTES
"Adult Mental Health Outpatient Group Therapy Progress Note     Client Initial Individualized Goals for Treatment: \"I just want to address this cycle and break it up and try to find some coping mechanisms that actually work\".    See Initial Treatment suggestions for the client during the time between Diagnostic Assessment and completion of the Master Individualized Treatment Plan.    Treatment Goals:  Will develop an aftercare / transition plan by exploring aftercare resources and employment options by time of discharge    In Life Skills groups, Rodo will work on exploring and clarifying purpose and meaning regarding employment    In Psychotherapy group, Rodo will work on identifying and practicing skills to help with emotion regulation especially when angry and frustrated each week.   Client will notify staff when needing assistance to develop or implement a coping plan to manage suicidal or self injurious urges.  Client will use coping plan for safety, as needed.                 Area of Treatment Focus:  Symptom Management and Personal Safety    Therapeutic Interventions/Treatment Strategies:  Support, Safety Assessments and Education    Response to Treatment Strategies:  Accepted Feedback, Gave Feedback, Listened, Focused on Goals, Attentive and Accepted Support     Group:  Group Therapy Date and Time:  7/18/2019 1062-0703 Group total: 3    Rodo stated he was feeling a bit nervous about going to an art event on Saturday. He will have to get up early to drive and states it is the driving that makes him the most anxious. He talked about getting pulled over by police two times in the past two years for no reason and he feels his car is a magnet for getting pulled over since there is no mirror on the left side right now.  He stated he will use the skills of \"just doing it\" he also has been journaling more, both writing and drawing. He is also attempting to find gratitude in his life. This shows to him he is " doing better with his mental health since he can be grateful.  He did not have any safety concerns today.     Patient would benefit from additional opportunities to practice and implement content from this session.    Is this a Weekly Review of the Progress on the Treatment Plan?  yes  Are Treatment Plan Goals being addressed?  Yes, continue treatment goals      Are Treatment Plan Strategies to Address Goals Effective?  Yes, continue treatment strategies      Are there any current contracts in place?  No

## 2019-07-22 ENCOUNTER — HOSPITAL ENCOUNTER (OUTPATIENT)
Dept: BEHAVIORAL HEALTH | Facility: CLINIC | Age: 24
End: 2019-07-22
Attending: PSYCHIATRY & NEUROLOGY
Payer: COMMERCIAL

## 2019-07-22 PROCEDURE — 90853 GROUP PSYCHOTHERAPY: CPT

## 2019-07-22 PROCEDURE — G0177 OPPS/PHP; TRAIN & EDUC SERV: HCPCS

## 2019-07-23 ENCOUNTER — HOSPITAL ENCOUNTER (OUTPATIENT)
Dept: BEHAVIORAL HEALTH | Facility: CLINIC | Age: 24
End: 2019-07-23
Attending: PSYCHIATRY & NEUROLOGY
Payer: COMMERCIAL

## 2019-07-23 ENCOUNTER — TELEPHONE (OUTPATIENT)
Dept: BEHAVIORAL HEALTH | Facility: CLINIC | Age: 24
End: 2019-07-23

## 2019-07-23 PROCEDURE — G0177 OPPS/PHP; TRAIN & EDUC SERV: HCPCS

## 2019-07-23 PROCEDURE — 90853 GROUP PSYCHOTHERAPY: CPT

## 2019-07-23 NOTE — PROGRESS NOTES
"Adult Mental Health Outpatient Group Therapy Progress Note     Client Initial Individualized Goals for Treatment: \"I just want to address this cycle and break it up and try to find some coping mechanisms that actually work\".    See Initial Treatment suggestions for the client during the time between Diagnostic Assessment and completion of the Master Individualized Treatment Plan.    Treatment Goals:  Will develop an aftercare / transition plan by exploring aftercare resources and employment options by time of discharge    In Life Skills groups, Rodo will work on exploring and clarifying purpose and meaning regarding employment    In Psychotherapy group, Rodo will work on identifying and practicing skills to help with emotion regulation especially when angry and frustrated each week.   Client will notify staff when needing assistance to develop or implement a coping plan to manage suicidal or self injurious urges.  Client will use coping plan for safety, as needed.                 Area of Treatment Focus:  Symptom Management and Personal Safety    Therapeutic Interventions/Treatment Strategies:  Support, Safety Assessments and Education    Response to Treatment Strategies:  Accepted Feedback, Gave Feedback, Listened, Focused on Goals, Attentive and Accepted Support     Group:  Group Therapy Date and Time 7/23/2019 7503-5964 Group total: 5    Description and outcome:  Mental Health Professional checked it with group by asking them: how they are feeling, what goal they are working on, what skill will be used to reach their goal, what is something they are proud of, what might be a barrier to their goal, how the therapist can support them, and if they need additional time to process.   Psychotherapist encouraged group to support each other by providing feedback as appropriate.   Rodo checked in and reports feeling content and grateful today, he processed how he was able to manage his emotions yesterday when he " revisited the boundaries issue with his girlfriend, he is proud of how he was able to control himself and the situation ended very well     Therapist validated and normalized emotions.  Pt accepted feedback from others and provided appropriate feedback to others in the group.    Pt did not endorse safety concerns at this time.    Pt reported taking all medications as prescribed.     Pt verbalized understanding of the session by engaging with the feedback from other group members.    Group:  Group Therapy Date and Time:  7/23/2019 0060-3063 Group total: 4    Mental Health Professional facilitated discussion with group about cognitive distortions, what they are and how they pop up in our thoughts and disrupt our ability to function effectively.  Pt participated in the discussion about cognitive distortions and how they affect their lives, discussed ways of combating them.    Patient would benefit from additional opportunities to practice and implement content from this session.    Is this a Weekly Review of the Progress on the Treatment Plan?  no  Are Treatment Plan Goals being addressed?  Yes, continue treatment goals      Are Treatment Plan Strategies to Address Goals Effective?  Yes, continue treatment strategies      Are there any current contracts in place?  No

## 2019-07-25 ENCOUNTER — HOSPITAL ENCOUNTER (OUTPATIENT)
Dept: BEHAVIORAL HEALTH | Facility: CLINIC | Age: 24
End: 2019-07-25
Attending: PSYCHIATRY & NEUROLOGY
Payer: COMMERCIAL

## 2019-07-25 DIAGNOSIS — F31.81 BIPOLAR 2 DISORDER (H): ICD-10-CM

## 2019-07-25 PROCEDURE — 99213 OFFICE O/P EST LOW 20 MIN: CPT | Performed by: NURSE PRACTITIONER

## 2019-07-25 PROCEDURE — 90853 GROUP PSYCHOTHERAPY: CPT

## 2019-07-25 PROCEDURE — G0177 OPPS/PHP; TRAIN & EDUC SERV: HCPCS

## 2019-07-25 RX ORDER — GABAPENTIN 100 MG/1
100-300 CAPSULE ORAL 3 TIMES DAILY PRN
Qty: 90 CAPSULE | Refills: 3 | Status: SHIPPED | OUTPATIENT
Start: 2019-07-25

## 2019-07-25 RX ORDER — LAMOTRIGINE 25 MG/1
75 TABLET ORAL 2 TIMES DAILY
Qty: 180 TABLET | Refills: 3 | Status: SHIPPED | OUTPATIENT
Start: 2019-07-25 | End: 2019-08-24

## 2019-07-25 NOTE — PROGRESS NOTES
"Adult Mental Health Outpatient Group Therapy Progress Note     Client Initial Individualized Goals for Treatment: \"I just want to address this cycle and break it up and try to find some coping mechanisms that actually work\".    See Initial Treatment suggestions for the client during the time between Diagnostic Assessment and completion of the Master Individualized Treatment Plan.    Treatment Goals:  Will develop an aftercare / transition plan by exploring aftercare resources and employment options by time of discharge    In Life Skills groups, Rodo will work on exploring and clarifying purpose and meaning regarding employment    In Psychotherapy group, Rodo will work on identifying and practicing skills to help with emotion regulation especially when angry and frustrated each week.   Client will notify staff when needing assistance to develop or implement a coping plan to manage suicidal or self injurious urges.  Client will use coping plan for safety, as needed.                 Area of Treatment Focus:  Symptom Management and Personal Safety    Therapeutic Interventions/Treatment Strategies:  Support, Safety Assessments and Education    Response to Treatment Strategies:  Accepted Feedback, Gave Feedback, Listened, Focused on Goals, Attentive and Accepted Support     Group:  Group Therapy Date and Time 7/25/2019 3467-3459 Group total: 6    Description and outcome:  Mental Health Professional checked it with group by asking them: how they are feeling, what goal they are working on, what skill will be used to reach their goal, what is something they are proud of, what might be a barrier to their goal, how the therapist can support them, and if they need additional time to process.   Psychotherapist encouraged group to support each other by providing feedback as appropriate.   Rodo reported feeling weird and having a mix of emotions to day, he was able to set up some appts for when he discharges next week and " is proud that he has been caring for himself by going outside     Therapist validated and normalized emotions.  Pt accepted feedback from others and provided appropriate feedback to others in the group.    Pt did not endorse safety concerns at this time.    Pt reported taking all medications as prescribed.     Pt verbalized understanding of the session by engaging with the feedback from other group members.  Is this a Weekly Review of the Progress on the Treatment Plan?  no  Are Treatment Plan Goals being addressed?  Yes, continue treatment goals      Are Treatment Plan Strategies to Address Goals Effective?  Yes, continue treatment strategies      Are there any current contracts in place?  No

## 2019-07-25 NOTE — PROGRESS NOTES
"[facility]   Psychiatric Progress Note        Interim History:   From H&P: From H&P:Sally Sanchez is a 23 year old transgender female to male individual who goes by the name of Rodo. He was referred to the Day Treatment Program by his physician assistant, Pretty Bower at [facility].  The reason for referral is management of depression, anger, dissociation, self injury behaviors, passive suicidal ideation, and inability to function.  The patient is a transgender female to male individual who goes by the name of Rodo.  He has a history of depression, possibly bipolar disorder, cluster B traits, and cannabis use disorder.  The patient reported the following stressors: Financial difficulties, recent loss of a job due to mental health symptoms, and some difficulties with girlfriend.  The patient's patient is a very good historian.  He is calm pleasant and responding to questions in a timely manner.  He is smiling at times.  The patient reports that the anxiety is currently high.  He is sleeping 7 hours a night and takes naps during the day.  He is very tired all the time.  Has been experiencing severe fatigue for the last 6 months.  Reports that difficulties with memory and concentration.  Appetite is low.  Reports using marijuana 3 times a day to help with appetite.  He has been having issues since high school.  He does not get hungry.  Of note, the patient is very thin.  Reports passive suicidal ideation, no plan or intent to harm himself.  He feels hopeless, helpless, worthless, and irritable.  He has ruminating thoughts.  Anxiety is rated as high.  Feels overwhelmed about life in general.  He worries about various things.  Reports panic attacks 2 times a week with symptoms such as \"emotional paralysis, cannot move, and shut down\".  Panic attack lasted 20 minutes to an hour.  When asked about manic symptoms, the patient reports that he has had hypomanic " episodes in the past however, was not able to come up with specific symptoms.  Reports that when manic he is very angry and goes into rages.  Reports that he might have had minor episodes of being impulsive, high energy, insomnia, and distractibility.  He has never been grandiose.  Reports one brief psychotic episode when he was 10 years old.  He was hearing voices for about 3 days.  Currently denies any psychotic symptoms including auditory visual hallucinations, paranoia, and delusions.  Denies history of PTSD, OCD, eating disorders, borderline personality disorder, suicide attempts.  Patient reports history of self injury behaviors by hitting and cutting.  The last time he engaged in hitting himself was about a month and a half ago.  The patient reports that he just moved to [place] from [place] last summer.  His psychiatrist is in [place].  In [place], his medications are refilled by his primary care provider.  He has not had medication changes for years.  Reports that the Lamictal is no longer providing the relief it used to.  He was not able to recall exactly how much lithium he is taking.  He is also on testosterone injections (which might be causing the anger outbursts).  Denies seizures, head injuries, and loss of consciousness.     6/27/19: Met with patient.  Reports that he is not feeling any better, in fact he feels that he is doing worse.  He has been feeling more suicidal in the last 2 days.  Denies plan or intention to harm himself.  Patient feels safe to go home.  The patient reports that he has been having difficulty sleeping, reaching 6 hours a night.  Reports increased energy.  He is depressed but also very anxious and angry.  He is not able to relax.  His anger is out of control.  He has ruminating thoughts. Reports that initially when he started gabapentin he felt better however, it worked only for 2 days.  Yesterday, the patient took 300 mg of gabapentin but did not feel that it was  helpful.  He continued to be very angry.  The does not feel that he is currently manic, however, during the initial intake, he reported that when manic he is very angry.  Denies psychotic symptoms.     The patient reports multiple stressors since the last time I saw him including his girlfriend having 3 surgeries this month, financial difficulties, inability to find a job, and relying on friends and family for financial support.     Discussed medication changes.  The patient was agreeable to discontinue Lamictal and start gabapentin 250 mg 3 times a day.  He will have a blood draw in 5 days and come back to see me after that.     Spoke with Juana, the patient's girlfriend with whom he lives the phone number is [phone].  Juana confirmed that in the last 2 days the patient has been more agitated and expressing suicidal ideation.  He had never made a plan.  He had never attempted suicide.  Reports that he has been stressed salt because she needed an emergency surgery last Friday and since then she has needed help with ADLs.  The patient had to cancel several shifts in order to take care of her.  Juana is agreeable to call 911 if the patient continued to decompensate.  States that in the past she had called [facility], and has no problem doing that again.     7/2/19: Met with patient.  Feels better overall, less anxious and agitated.  Reports that he had his testosterone dosage has changed in the last few weeks.   Rodo had a menstrual period last week and needed to go back to his primary care provider for adjustment in the testosterone.  Patient reports that his girlfriend was finally that a diagnosis and will have another surgery coming up.  The patient feels better knowing what is wrong with his girlfriend.  Reports that her parents have been staying here and helping her while he is working or going to groups.  The patient reports that he did not feel comfortable starting Depakote.  He has been on Lamictal for many  "years.  He feels that this is a very drastic change.  He is no longer suicidal.  He is still not sleeping well.  He is appetite is low, he forces himself to eat.  The patient reports that he took another dose of the gabapentin and that was much more helpful than last week.  Currently worries about not be able to come to this program anymore.  He is hoping that the insurance will continue to pay.  He feels that this problem has made a big difference and his mental health symptoms.    7/27/19: Met with patient. Doing much better. Anger is under control. States his hormones are now regulated and this makes a difference. His girlfriend had another surgery about a week ago and is doing well. She is walking. He has been working about 20 hours a week, while his GF is recovering. His stress level is decreasing. Sleep is \"OK\". Depression and anxiety are moderate. Has been taking Gabapentin, 100 mg, 2-3 times a day for anxiety. Denies SI/SIB.   He is discharging next week from this program. Will follow up with his PCP. Advised him to find a psychiatrist if he wants med changes. Patient is interested in Depakote.          Medications:   1.  Lamictal 75 mg twice a day  2.  Gabapentin 100 to 300 mg 3 times a day as needed for anxiety  3.  Testosterone, managed by his primary care provider.       Allergies:     Allergies   Allergen Reactions     Sulfa Drugs Rash          Labs:     No results found for this or any previous visit (from the past 672 hour(s)).         Psychiatric Examination:                      Weight is 0 lbs 0 oz  There is no height or weight on file to calculate BMI.     Appearance: well groomed, awake, alert, cooperative, mild distress and thin  Attitude:  cooperative  Eye Contact:  good  Mood:  anxious, depressed and better  Affect:  mood congruent  Speech:  clear, coherent  Psychomotor Behavior:  no evidence of tardive dyskinesia, dystonia, or tics  Throught Process:  logical and goal oriented  Associations:  " no loose associations  Thought Content:  no evidence of suicidal ideation or homicidal ideation  Insight:  good  Judgement:  intact  Oriented to:  time, person, and place  Attention Span and Concentration:  intact  Recent and Remote Memory:  intact         Precautions:           DIagnoses:   1.  Major depressive disorder, recurrent, severe, without psychosis versus Bipolar 2 disorder currently depressed  2.  Restless leg syndrome, per history  3.  Cluster B traits  4.  Cannabis use disorder  5.  Rule out eating disorder         Plan:   1. Continue Day Treatment Program. Patient finds the education and support of the the program helpful in keeping current symptoms under control. Discharging next week.   2. Med changes: none.  3. The patient was encourage to follow up with PCP  4. The patient was advised to let us know if inpatient admission or further help is needed.  5. Care was coordinated with the treatment team.     Sosa PABLO CNP  Date: 07/25/19  Time: 1:27 PM

## 2019-07-25 NOTE — PROGRESS NOTES
"Adult Mental Health Outpatient Group Therapy Progress Note     Client Initial Individualized Goals for Treatment: \"I just want to address this cycle and break it up and try to find some coping mechanisms that actually work\".     See Initial Treatment suggestions for the client during the time between Diagnostic Assessment and completion of the Master Individualized Treatment Plan.     Treatment Goals:  Will develop an aftercare / transition plan by exploring aftercare resources and employment options by time of discharge    In Life Skills groups, Rodo will work on exploring and clarifying purpose and meaning regarding employment    In Psychotherapy group, Rodo will work on identifying and practicing skills to help with emotion regulation especially when angry and frustrated each week.   Client will notify staff when needing assistance to develop or implement a coping plan to manage suicidal or self injurious urges.  Client will use coping plan for safety, as needed.       Area of Treatment Focus:  Symptom Management    Therapeutic Interventions/Treatment Strategies:  Support, Feedback, Structured Activity and Clarification    Response to Treatment Strategies:  Accepted Feedback, Gave Feedback, Listened, Focused on Goals, Attentive, Accepted Support and Alert     Name of Group: mental health  Management  Date/Time: 7/25 / 19 200 to 250    Number of Group Participants: 6     Description and Outcome:  The group began with education and discussion around the importance of body in recovery/growth.  Clients acknowledged that in most parts of life, the body plays an integral part, one exception being in dissociation.  Discussed comfort zone and need to practise attending to body with only slight discomfort. The group explored breath.  Client demonstrated understanding of session by participating in experiential and contributing to discussion.    Client would benefit from additional opportunities to practice and " implement content from this session.    Is this a Weekly Review of the Progress on the Treatment Plan?  No

## 2019-07-26 NOTE — PROGRESS NOTES
Adult Mental Health Day Treatment  TRACK: 5C    PATIENT'S NAME: Sally Sanchez  MRN:   [id number]  :   1995  ACCT. NUMBER: [id number]  DATE OF SERVICE: 19  START TIME: 1400  END TIME: 1450  NUMBER OF PARTICIPANTS: 4      Summary of Group / Topics Discussed:  Communication and Social Skills Development: Social Supports: Reaching Out: Patients were taught and gained awareness of the importance of connecting with other people as a way to expand their support network.  Patients were provided with skills and opportunity to practice ways to improve overall communication and connection with other people.      Patient Session Goals / Objectives:    Identified strengths and opportunities for growth in asking for help and how this impacts their ability to clearly communicate needs to other people       Improved awareness of important aspects of asking for help and support and how this relates to mental health recovery        Established a plan for practice of these skills in their own environments    Practiced and reflected on how to generalize taught skills to their everyday life    Patient Participation / Response:  Moderately participated, sharing some personal reflections / insights and adequately adequately received / provided feedback with other participants.    Patient presentation: even mood, fair concentration and energy and Patient would benefit from additional opportunities to practice the content to be able to generalize it to their everyday life with increased intentionality, consistency, and efficacy in support of their psychiatric recovery    Treatment Plan:  Patient has a current master individualized treatment plan.  See Epic treatment plan for more information.          LAVINIA Marin/BASIL

## 2019-07-29 ENCOUNTER — HOSPITAL ENCOUNTER (OUTPATIENT)
Dept: BEHAVIORAL HEALTH | Facility: CLINIC | Age: 24
End: 2019-07-29
Attending: PSYCHIATRY & NEUROLOGY
Payer: COMMERCIAL

## 2019-07-29 PROCEDURE — G0177 OPPS/PHP; TRAIN & EDUC SERV: HCPCS

## 2019-07-29 PROCEDURE — 90853 GROUP PSYCHOTHERAPY: CPT

## 2019-07-29 NOTE — PROGRESS NOTES
"Adult Mental Health Outpatient Group Therapy Progress Note     Client Initial Individualized Goals for Treatment: \"I just want to address this cycle and break it up and try to find some coping mechanisms that actually work\".     See Initial Treatment suggestions for the client during the time between Diagnostic Assessment and completion of the Master Individualized Treatment Plan.     Treatment Goals:  Will develop an aftercare / transition plan by exploring aftercare resources and employment options by time of discharge    In Life Skills groups, Rodo will work on exploring and clarifying purpose and meaning regarding employment    In Psychotherapy group, Rodo will work on identifying and practicing skills to help with emotion regulation especially when angry and frustrated each week.   Client will notify staff when needing assistance to develop or implement a coping plan to manage suicidal or self injurious urges.  Client will use coping plan for safety, as needed.       Area of Treatment Focus:  Symptom Management    Therapeutic Interventions/Treatment Strategies:  Support, Feedback, Structured Activity and Clarification    Response to Treatment Strategies:  Accepted Feedback, Gave Feedback, Listened, Focused on Goals, Attentive, Accepted Support and Alert     Group:  Group Therapy Date and Time 7/29/2019 1213-1657 Group total: 8    Description and outcome:  Mental Health Professional checked it with group by asking them: how they are feeling, what goal they are working on, what skill will be used to reach their goal, what is something they are proud of, what might be a barrier to their goal, how the therapist can support them, and if they need additional time to process.   Psychotherapist encouraged group to support each other by providing feedback as appropriate.   Rodo reports feeling tired today, he reports he was at the hospital with his GF this morning, he has a goal of setting up the rest of his " appts before tomorrow when he discharges, he is proud that he did an art event this weekend.     Therapist validated and normalized emotions.  Pt accepted feedback from others and provided appropriate feedback to others in the group.    Pt did not endorse safety concerns at this time.    Pt reported taking all medications as prescribed.     Pt verbalized understanding of the session by engaging with the feedback from other group members.    Group:  Group Therapy Date and Time:  7/29/2019 6327-1074 Group total: 8    Mental Health Professional facilitated discussion with group about assertiveness and communicating effectively.  Pt participated in the discussion about assertive communication and how to implement it in their lives.    Patient would benefit from additional opportunities to practice and implement content from this session.      Is this a Weekly Review of the Progress on the Treatment Plan?  yes

## 2019-07-30 ENCOUNTER — HOSPITAL ENCOUNTER (OUTPATIENT)
Dept: BEHAVIORAL HEALTH | Facility: CLINIC | Age: 24
End: 2019-07-30
Attending: PSYCHIATRY & NEUROLOGY
Payer: COMMERCIAL

## 2019-07-30 PROCEDURE — 90853 GROUP PSYCHOTHERAPY: CPT

## 2019-07-30 PROCEDURE — G0177 OPPS/PHP; TRAIN & EDUC SERV: HCPCS

## 2019-07-30 NOTE — PROGRESS NOTES
"Adult Mental Health Outpatient Group Therapy Progress Note     Client Initial Individualized Goals for Treatment: \"I just want to address this cycle and break it up and try to find some coping mechanisms that actually work\".     See Initial Treatment suggestions for the client during the time between Diagnostic Assessment and completion of the Master Individualized Treatment Plan.     Treatment Goals:  Will develop an aftercare / transition plan by exploring aftercare resources and employment options by time of discharge    In Life Skills groups, Rodo will work on exploring and clarifying purpose and meaning regarding employment    In Psychotherapy group, Rodo will work on identifying and practicing skills to help with emotion regulation especially when angry and frustrated each week.   Client will notify staff when needing assistance to develop or implement a coping plan to manage suicidal or self injurious urges.  Client will use coping plan for safety, as needed.       Area of Treatment Focus:  Symptom Management    Therapeutic Interventions/Treatment Strategies:  Support, Feedback, Structured Activity and Clarification    Response to Treatment Strategies:  Accepted Feedback, Gave Feedback, Listened, Focused on Goals, Attentive, Accepted Support and Alert     Group:  Group Therapy Date and Time 7/30/2019 8778-4666 Group total: 5    Description and outcome:  Mental Health Professional checked it with group by asking them: how they are feeling, what goal they are working on, what skill will be used to reach their goal, what is something they are proud of, what might be a barrier to their goal, how the therapist can support them, and if they need additional time to process.   Psychotherapist encouraged group to support each other by providing feedback as appropriate.   Rodo reports feeling anxious and exhausted today, he reports he had a difficult conversation with his GF and they are considering breaking " up, he processed the discussion and how he is proud of how he was not explosive during the converstaion     Therapist validated and normalized emotions.  Pt accepted feedback from others and provided appropriate feedback to others in the group.    Pt did not endorse safety concerns at this time.    Pt reported taking all medications as prescribed.     Pt verbalized understanding of the session by engaging with the feedback from other group members.    Group:  Group Therapy Date and Time:  7/30/2019 9993-1378 Group total: 6    Mental Health Professional facilitated discussion with group about assertive language and setting boundaries.  Pt participated in the discussion about being assertive and setting boundaries and barriers to doing so, what it may look like with different people.    Patient would benefit from additional opportunities to practice and implement content from this session.      Is this a Weekly Review of the Progress on the Treatment Plan?  no

## 2019-07-30 NOTE — PROGRESS NOTES
Adult Mental Health Intensive Outpatient Discharge Summary/Instructions      Patient: Sally Sanchez MRN: [id number]   : 1995 Age: 23 year old Sex: female     Admission Date: 6/3/2019  Discharge Date: 2019  Diagnosis: 296.32 (F33.1) Major Depressive Disorder, Recurrent Episode, Moderate _  301.83 (F60.3) Borderline Personality Disorder    Focus of Treatment / Progress    Personal Safety:      * Follow your safety plan     * Call crisis lines as needed:    [facility] [phone] [facility] [phone]  [facility] [phone] Crisis Connection [phone]  [facility] [phone] [facility] [phone]  [facility] [phone] National Suicide Prevention [phone]  [facility] [phone] Suicide Prevention [phone]  [facility] [phone]    Managing symptoms of:  Anger, frustration, worry, agitation, sadness, hopelessness    Community support/health:      Managing Symptoms and Preventing Relapse    * Go to all of your appointments    * Take all medications as directed.      * Carry a current list if medication with you    * Do not use illicit (street) drugs.  Avoid alcohol    * Report these symptoms to your care team. These are early signs of relapse:   Thoughts of suicide   Losing more sleep   Increased confusion   Mood getting worse   Feeling more aggressive    *Use these skills daily:  Mindfulness, meditation, opposite action, asking for help, being outside, taking care of emotional needs, getting out of your head by setting time limits    Copy of summary sent to: JALEESA    Follow up with psychiatrist / main caregiver: radha, has referral in progress    Next visit: radha    Follow up with your therapist: [facility]   Next visit: 2019    Go to group therapy and / or support groups at: LGBTQ groups, DBT groups    See your medical doctor about:  Any other concerns and for your medical needs    Other: NA    Client Signature:_______________________  Date /  Time:___________  Staff Signature:________________________   Date / Time:___________

## 2019-07-31 NOTE — PROGRESS NOTES
Adult Mental Health Day Treatment  TRACK: 5C    PATIENT'S NAME: Sally Sanchez  MRN:   [id number]  :   1995  ACCT. NUMBER: [id number]  DATE OF SERVICE: 19  START TIME: 1300  END TIME: 1350  NUMBER OF PARTICIPANTS: 6      Summary of Group / Topics Discussed:  Lifestyle Balance and Structure: Occupations: Patients were provided with an overview on the importance of daily occupations in support of mental health management.  Patients were assisted to establish, restore, and/or modify performance skills and patterns for improved engagement and promotion of positive mental health through meaningful occupations.  Patients gained awareness of the connection between who they are (self identity) with what they do.    Patient Session Goals / Objectives:    Increased awareness of how patient s functioning in identified meaningful occupations are impacted by their mental health status     Developed performance skills and performance patterns to enhance occupational engagement    Explored ways to generalize new awareness and skills to their everyday life    Patient Participation / Response:  Moderately participated, sharing some personal reflections / insights and adequately received / provided feedback with other participants.    Patient presentation: fair concentration and energy, Demonstrated understanding of content through identifying meaningful occupations and roles he might pursue  and Patient would benefit from additional opportunities to practice the content to be able to generalize it to their everyday life with increased intentionality, consistency, and efficacy in support of their psychiatric recovery    Treatment Plan:  Patient has a current master individualized treatment plan.  See Epic treatment plan for more information.        LAVINIA Marin/BASIL

## 2019-08-06 NOTE — PROGRESS NOTES
Adult Mental Health Day Treatment  TRACK: 5C    PATIENT'S NAME: Sally Sanchez  MRN:   [id number]  :   1995  ACCT. NUMBER: [id number]  DATE OF SERVICE: 19  START TIME: 1400  END TIME: 1450  NUMBER OF PARTICIPANTS: 6      Summary of Group / Topics Discussed:  Sensory Approaches in Mental Health: Focused Activity: Patients were provided with verbal and experiential education to identify physical and sensorimotor based activities that can be utilized for stress management, self care, health maintenance, and self regulation.  Patients increased knowledge and awareness of activities that support good self care, build resiliency, and prevent relapse through healthy engagement in mindful focused activities for effective coping with illness and reduction of maladaptive coping skills.     Patient Session Goals / Objectives:    Identified specific physical and sensorimotor based activities for stress management, self care, health maintenance, and self regulation      Improved awareness of activities that are meaningful focused activities that support good self care, build resiliency, and prevent relapse and how this applies to current daily life    Established a plan for practice of these skills in their own environments    Practiced and reflected on how to generalize taught skills to their everyday life    Patient Participation / Response:  Fully participated with the group by sharing personal reflections / insights and openly received / provided feedback with other participants.    Patient presentation: even mood, social with peers, future oriented and Verbalized understanding of content    Treatment Plan:  Patient has See Epic Treatment Plan - Patient is discharging.  No safety concerns reported.            Marybel Turner OTR/L

## 2019-08-06 NOTE — ADDENDUM NOTE
Encounter addended by: Marybel Turner OTR/L on: 8/6/2019 3:29 PM   Actions taken: Sign clinical note

## 2021-01-03 ENCOUNTER — HEALTH MAINTENANCE LETTER (OUTPATIENT)
Age: 26
End: 2021-01-03

## 2021-01-04 NOTE — PROGRESS NOTES
"Adult Mental Health Outpatient Group Therapy Progress Note     Client Initial Individualized Goals for Treatment: \"I just want to address this cycle and break it up and try to find some coping mechanisms that actually work\".    See Initial Treatment suggestions for the client during the time between Diagnostic Assessment and completion of the Master Individualized Treatment Plan.    Treatment Goals:  Follow Safety Plan   Ask for more information, support and/or assistance as needed.  Follow up with providers/community supports as needed: Interested in psychiatry.  Interested in individual therapy.  Report increases or changes in symptoms to staff.  Report any personal safety concerns to staff.   Take medications as prescribed.  Report medication changes and/or side effects to staff.  Attend and participate in groups as scheduled or notify staff if unable to do so.  Report any use of substances to staff as this may impact your symptoms and/or personal safety.  Notify staff if you have any other issues that need to be addressed. This may include any current abuse / neglect / exploitation or other vulnerability.  Follow recommendations of your treatment team and discuss concerns if not in agreement.                  Area of Treatment Focus:  Symptom Management and Personal Safety    Therapeutic Interventions/Treatment Strategies:  Support, Safety Assessments and Education    Response to Treatment Strategies:  Accepted Feedback, Gave Feedback, Listened, Focused on Goals, Attentive and Accepted Support     Group:  Group Therapy Date and Time 6/20/2019 [id number] Group total: 6    Description and outcome:  Mental Health Professional checked it with group by asking them: how they are feeling, what goal they are working on, what skill will be used to reach their goal, what is something they are proud of, what might be a barrier to their goal, how the therapist can support them, and if they need additional time to process.   " Medicare Wellness Visit  Plan for Preventive Care    A good way for you to stay healthy is to use preventive care.  Medicare covers many services that can help you stay healthy.* The goal of these services is to find any health problems as quickly as possible. Finding problems early can help make them easier to treat.  Your personal plan below lists the services you may need and when they are due.     Health Maintenance Summary     Shingles Vaccine (2 of 3)  Overdue since 12/27/2013    Abdominal Aortic Aneurysm (AAA) Screening (Once)  Overdue since 2/11/2018    Diabetes Foot Exam (Yearly)  Due since 12/6/2020    Medicare Wellness Visit (Yearly)  Due since 12/6/2020    Diabetes A1C (Every 3 Months)  Next due on 3/31/2021    Colonoscopy Risk (Every 3 Years)  Next due on 4/27/2021    Diabetes Eye Exam (Yearly)  Next due on 10/23/2021    DM/CKD Microalbumin (Yearly)  Next due on 12/31/2021    DM/CKD GFR (Yearly)  Next due on 12/31/2021    DTaP/Tdap/Td Vaccine (3 - Td)  Next due on 12/6/2029    Hepatitis C Screening   Completed    Pneumococcal Vaccine 65+   Completed    Influenza Vaccine   Completed    Meningococcal Vaccine   Aged Out    HPV Vaccine   Aged Out           Preventive Care for Women and Men    Heart Screenings (Cardiovascular):  · Blood tests are used to check your cholesterol, lipid and triglyceride levels. High levels can increase your risk for heart disease and stroke. High levels can be treated with medications, diet and exercise. Lowering your levels can help keep your heart and blood vessels healthy.  Your provider will order these tests if they are needed.    · An ultrasound is done to see if you have an abdominal aortic aneurysm (AAA).  This is an enlargement of one of the main blood vessels that delivers blood to the body.   In the [place], 9,000 deaths are caused by AAA.  You may not even know you have this problem and as many as 1 in 3 people will have a serious problem if it is not treated.   Early diagnosis allows for more effective treatment and cure.  If you have a family history of AAA or are a male age 65-75 who has smoked, you are at higher risk of an AAA.  Your provider can order this test, if needed.    Colorectal Screening:  · There are many tests that are used to check for cancer of your colon and rectum. You and your provider should discuss what test is best for you and when to have it done.  Options include:  · Screening Colonoscopy: exam of the entire colon, seen through a flexible lighted tube.  · Flexible Sigmoidoscopy: exam of the last third (sigmoid portion) of the colon and rectum, seen through a flexible lighted tube.  · Cologuard DNA stool test: a sample of your stool is used to screen for cancer and unseen blood in your stool.  · Fecal Occult Blood Test: a sample of your stool is studied to find any unseen blood    Flu Shot:  · An immunization that helps to prevent influenza (the flu). You should get this every year. The best time to get the shot is in the fall.    Pneumococcal Shot:  • Vaccines are available that can help prevent pneumococcal disease, which is any type of infection caused by Streptococcus pneumoniae bacteria.   Their use can prevent some cases of pneumonia, meningitis, and sepsis. There are two types of pneumococcal vaccines:   o Conjugate vaccines (PCV-13 or Prevnar 13®) - helps protect against the 13 types of pneumococcal bacteria that are the most common causes of serious infections in children and adults.    o Polysaccharide vaccine (PPSV23 or Bfaelgkmm70®) - helps protect against 23 types of pneumococcal bacteria for patients who are recommended to get it.  These vaccines should be given at least 12 months apart.  A booster is usually not needed.     Hepatitis B Shot:  · An immunization that helps to protect people from getting Hepatitis B. Hepatitis B is a virus that spreads through contact with infected blood or body fluids. Many people with the virus do not  Psychotherapist encouraged group to support each other by providing feedback as appropriate.    Rodo checked in and reports feeling mixed, skeptical, frustrated and content, he has been struggling with racing thoughts, he has a goal of scheduling to get his taxes done, he will  his RX tomorrow, he has been struggling with limited income     Therapist validated and normalized emotions.  Pt accepted feedback from others and provided appropriate feedback to others in the group.    Pt did not endorse safety concerns at this time.    Pt reported taking all medications as prescribed.     Pt verbalized understanding of the session by engaging with the feedback from other group members.  Is this a Weekly Review of the Progress on the Treatment Plan?  no  Are Treatment Plan Goals being addressed?  Yes, continue treatment goals      Are Treatment Plan Strategies to Address Goals Effective?  Yes, continue treatment strategies      Are there any current contracts in place?  No         have symptoms.  The virus can lead to serious problems, such as liver disease. Some people are at higher risk than others. Your doctor will tell you if you need this shot.     Diabetes Screening:  · A test to measure sugar (glucose) in your blood is called a fasting blood sugar. Fasting means you cannot have food or drink for at least 8 hours before the test. This test can detect diabetes long before you may notice symptoms.    Glaucoma Screening:  · Glaucoma screening is performed by your eye doctor. The test measures the fluid pressure inside your eyes to determine if you have glaucoma.     Hepatitis C Screening:  · A blood test to see if you have the hepatitis C virus.  Hepatitis C attacks the liver and is a major cause of chronic liver disease.  Medicare will cover a single screening for all adults born between 1945 & 1965, or high risk patients (people who have injected illegal drugs or people who have had blood transfusions).  High risk patients who continue to inject illegal drugs can be screened for Hepatitis C every year.    Smoking and Tobacco-Use Cessation Counseling:  · Tobacco is the single greatest cause of disease and early death in our country today. Medication and counseling together can increase a person’s chance of quitting for good.   · Medicare covers two quitting attempts per year, with four counseling sessions per attempt (eight sessions in a 12 month period)    Preventive Screening tests for Women    Screening Mammograms and Breast Exams:  · An x-ray of your breasts to check for breast cancer before you or your doctor may be able to feel it.  If breast cancer is found early it can usually be treated with success.    Pelvic Exams and Pap Tests:  · An exam to check for cervical and vaginal cancer. A Pap test is a lab test in which cells are taken from your cervix and sent to the lab to look for signs of cervical cancer. If cancer of the cervix is found early, chances for a cure are good.  Testing can generally end at age 65, or if a woman has a hysterectomy for a benign condition. Your provider may recommend more frequent testing if certain abnormal results are found.    Bone Mass Measurements:  · A painless x-ray of your bone density to see if you are at risk for a broken bone. Bone density refers to the thickness of bones or how tightly the bone tissue is packed.    Preventive Screening tests for Men    Prostate Screening:  · Should you have a prostate cancer test (PSA)?  It is up to you to decide if you want a prostate cancer test. Talk to your clinician to find out if the test is right for you.  Things for you to consider and talk about should include:  · Benefits and harms of the test  · Your family history  · How your race/ethnicity may influence the test  · If the test may impact other medical conditions you have  · Your values on screenings and treatments    *Medicare pays for many preventive services to keep you healthy. For some of these services, you might have to pay a deductible, coinsurance, and / or copayment.  The amounts vary depending on the type of services you need and the kind of Medicare health plan you have.

## 2021-10-10 ENCOUNTER — HEALTH MAINTENANCE LETTER (OUTPATIENT)
Age: 26
End: 2021-10-10

## 2022-01-01 NOTE — PROGRESS NOTES
"Adult Mental Health Outpatient Group Therapy Progress Note     Client Initial Individualized Goals for Treatment: \"I just want to address this cycle and break it up and try to find some coping mechanisms that actually work\".    See Initial Treatment suggestions for the client during the time between Diagnostic Assessment and completion of the Master Individualized Treatment Plan.    Treatment Goals:  Will develop an aftercare / transition plan by exploring aftercare resources and employment options by time of discharge    In Life Skills groups, Rodo will work on exploring and clarifying purpose and meaning regarding employment    In Psychotherapy group, Rodo will work on identifying and practicing skills to help with emotion regulation especially when angry and frustrated each week.   Client will notify staff when needing assistance to develop or implement a coping plan to manage suicidal or self injurious urges.  Client will use coping plan for safety, as needed.                 Area of Treatment Focus:  Symptom Management and Personal Safety    Therapeutic Interventions/Treatment Strategies:  Support, Safety Assessments and Education    Response to Treatment Strategies:  Accepted Feedback, Gave Feedback, Listened, Focused on Goals, Attentive and Accepted Support     Group:  Group Therapy Date and Time 7/22/2019 [id number] Group total: 5    Description and outcome:  Mental Health Professional checked it with group by asking them: how they are feeling, what goal they are working on, what skill will be used to reach their goal, what is something they are proud of, what might be a barrier to their goal, how the therapist can support them, and if they need additional time to process.   Psychotherapist encouraged group to support each other by providing feedback as appropriate.   randi checked in and reports feeling irritated and frustrated today, he processed how when he has small arguments with his girlfriend he " gets into a thought spiral about breaking up, he then feels guilty for having the thoughts, he recently reinforced a boundary and is strugggling with feelings of guilt for setting a bounday     Therapist validated and normalized emotions.  Pt accepted feedback from others and provided appropriate feedback to others in the group.    Pt did not endorse safety concerns at this time.    Pt reported taking all medications as prescribed.     Pt verbalized understanding of the session by engaging with the feedback from other group members.    Group:  Group Therapy Date and Time:  7/22/2019 [id number] Group total: 4    Mental Health Professional facilitated discussion with group about cognitive distortions, what they are and how they pop up in our thoughts and disrupt our ability to function effectively.  Pt participated in the discussion about cognitive distortions and how they affect their lives, discussed ways of combating them.    Patient would benefit from additional opportunities to practice and implement content from this session.  Is this a Weekly Review of the Progress on the Treatment Plan?  yes  Are Treatment Plan Goals being addressed?  Yes, continue treatment goals      Are Treatment Plan Strategies to Address Goals Effective?  Yes, continue treatment strategies      Are there any current contracts in place?  No         Statement Selected

## 2022-01-06 NOTE — TELEPHONE ENCOUNTER
Prepped: right neck. Prepped with: [employer]lor[employer].  Spoke with patient and called Employ[employer] to get fax number.   Left message with Employer to call back.  Patient informed.  Letter left at  of clinic while waiting for call back

## 2022-01-29 ENCOUNTER — HEALTH MAINTENANCE LETTER (OUTPATIENT)
Age: 27
End: 2022-01-29

## 2022-09-18 ENCOUNTER — HEALTH MAINTENANCE LETTER (OUTPATIENT)
Age: 27
End: 2022-09-18

## 2022-10-04 PROBLEM — F64.0 TRANSSEXUALISM: Status: ACTIVE | Noted: 2018-09-18

## 2023-05-07 ENCOUNTER — HEALTH MAINTENANCE LETTER (OUTPATIENT)
Age: 28
End: 2023-05-07